# Patient Record
Sex: FEMALE | Race: WHITE | NOT HISPANIC OR LATINO | Employment: OTHER | URBAN - NONMETROPOLITAN AREA
[De-identification: names, ages, dates, MRNs, and addresses within clinical notes are randomized per-mention and may not be internally consistent; named-entity substitution may affect disease eponyms.]

---

## 2017-02-22 ENCOUNTER — OFFICE VISIT (OUTPATIENT)
Dept: CARDIOLOGY | Facility: CLINIC | Age: 47
End: 2017-02-22

## 2017-02-22 VITALS
DIASTOLIC BLOOD PRESSURE: 86 MMHG | BODY MASS INDEX: 43.4 KG/M2 | HEART RATE: 71 BPM | HEIGHT: 69 IN | WEIGHT: 293 LBS | SYSTOLIC BLOOD PRESSURE: 134 MMHG

## 2017-02-22 DIAGNOSIS — Z72.0 TOBACCO ABUSE: ICD-10-CM

## 2017-02-22 DIAGNOSIS — Q23.1 AORTIC STENOSIS WITH BICUSPID VALVE: Primary | ICD-10-CM

## 2017-02-22 DIAGNOSIS — R00.2 PALPITATIONS: ICD-10-CM

## 2017-02-22 DIAGNOSIS — Q23.0 AORTIC STENOSIS WITH BICUSPID VALVE: Primary | ICD-10-CM

## 2017-02-22 PROCEDURE — 99213 OFFICE O/P EST LOW 20 MIN: CPT | Performed by: NURSE PRACTITIONER

## 2017-02-22 RX ORDER — GABAPENTIN 100 MG/1
100 CAPSULE ORAL AS NEEDED
Refills: 2 | COMMUNITY
Start: 2016-12-21 | End: 2018-08-31

## 2017-02-22 RX ORDER — MELOXICAM 15 MG/1
TABLET ORAL AS NEEDED
Refills: 2 | COMMUNITY
Start: 2016-12-21 | End: 2017-08-25

## 2017-02-22 NOTE — PROGRESS NOTES
"Encounter Date:02/22/2017    Patient ID: Luisa Conteh is a 46 y.o. female who is  and resides in [place], [place].    CC/Reason for visit:  Follow-up (Palpitations, HTN, Results from holter monitor. )          Luisa Conteh returns today to discuss the results of her 48 hour Holter monitor.  At her last visit the patient had complaints of palpitations and at times feeling a fluttering sensation in her chest.  Her 48 hour Holter monitor was normal showing rare PACs and occasional PVCs occurring only 0.1% of the time.  She has had no further symptoms and is able to be active cooking and cleaning and caring for her family without any undue chest pain, dyspnea, orthopnea, or syncopal episodes.  She actually feels that her palpitations have lessened since her last visit.  She feels she is overall doing well and is relieved her monitor results was normal.  She is still continuing to smoke one half to one pack of cigarettes per day.  She declines any smoking cessation aids at this time.      Review of Systems   All other systems reviewed and are negative.      The patient's past medical, social, and family history reviewed in the patient's electronic medical record.    Allergies  Review of patient's allergies indicates no known allergies.    No outpatient prescriptions have been marked as taking for the 2/22/17 encounter (Office Visit) with Brian Vang MD.         Blood pressure 134/86, pulse 71, height 69\" (175.3 cm), weight 297 lb 6.4 oz (135 kg).  Body mass index is 43.92 kg/(m^2).    Physical Exam   Constitutional: She is oriented to person, place, and time. She appears well-developed and well-nourished.   HENT:   Head: Normocephalic and atraumatic.   Eyes: Pupils are equal, round, and reactive to light. No scleral icterus.   Neck: No JVD present. Carotid bruit is not present. No thyromegaly present.   Cardiovascular: Normal rate, regular rhythm, S1 normal and S2 normal.  Exam reveals " no gallop.    Murmur heard.   Harsh midsystolic murmur is present with a grade of 2/6  at the upper right sternal border radiating to the neck  Pulmonary/Chest: Effort normal and breath sounds normal.   Abdominal: Soft. There is no tenderness.   Neurological: She is alert and oriented to person, place, and time.   Skin: Skin is warm and dry. No cyanosis. Nails show no clubbing.   Psychiatric: She has a normal mood and affect. Her behavior is normal.       Data Review:   Procedures         Problem List Items Addressed This Visit        Cardiovascular and Mediastinum    Aortic stenosis with bicuspid valve - Primary    Overview     · Transesophageal echocardiogram (06/2016):  Bicuspid aortic valve present with fusion of the left and non-coronary cusp.  Moderate aortic stenosis with a mean gradient of 26 mmHg.  · NYHA class III                Current Assessment & Plan     · Echocardiogram in 6 months         Relevant Orders    Adult Transthoracic Echo Complete With Contrast    Palpitations    Overview     · 48 hour Holter (01/04/2017): Normal Holter with only rare PACs and PVCs at 0.1% burden         Current Assessment & Plan     · No further treatment needed            Other    Tobacco abuse    Current Assessment & Plan     · Tobacco abuse is unchanged  · Declines smoking cessation therapy at this time.                    · Continue current medications  · Obtain echocardiogram in 6 months on same day as follow-up.    Do CROOKS evaluated treated this patient independently    DAKSHA Rm  2/22/2017

## 2017-08-25 ENCOUNTER — HOSPITAL ENCOUNTER (OUTPATIENT)
Dept: CARDIOLOGY | Facility: HOSPITAL | Age: 47
Discharge: HOME | End: 2017-08-25
Attending: INTERNAL MEDICINE | Admitting: INTERNAL MEDICINE

## 2017-08-25 ENCOUNTER — OFFICE VISIT (OUTPATIENT)
Dept: CARDIOLOGY | Facility: CLINIC | Age: 47
End: 2017-08-25

## 2017-08-25 VITALS
WEIGHT: 293 LBS | BODY MASS INDEX: 43.4 KG/M2 | HEART RATE: 62 BPM | DIASTOLIC BLOOD PRESSURE: 86 MMHG | SYSTOLIC BLOOD PRESSURE: 132 MMHG | HEIGHT: 69 IN

## 2017-08-25 DIAGNOSIS — Q23.1 AORTIC STENOSIS WITH BICUSPID VALVE: ICD-10-CM

## 2017-08-25 DIAGNOSIS — Q23.0 AORTIC STENOSIS WITH BICUSPID VALVE: ICD-10-CM

## 2017-08-25 DIAGNOSIS — Q23.0 AORTIC STENOSIS WITH BICUSPID VALVE: Primary | ICD-10-CM

## 2017-08-25 DIAGNOSIS — Q23.1 AORTIC STENOSIS WITH BICUSPID VALVE: Primary | ICD-10-CM

## 2017-08-25 LAB
BH CV ECHO MEAS - AO MAX PG (FULL): 47.9 MMHG
BH CV ECHO MEAS - AO MAX PG: 51.6 MMHG
BH CV ECHO MEAS - AO MEAN PG (FULL): 27 MMHG
BH CV ECHO MEAS - AO MEAN PG: 28 MMHG
BH CV ECHO MEAS - AO ROOT AREA (BSA CORRECTED): 1.2
BH CV ECHO MEAS - AO ROOT AREA: 6.6 CM^2
BH CV ECHO MEAS - AO ROOT DIAM: 2.9 CM
BH CV ECHO MEAS - AO V2 MAX: 359 CM/SEC
BH CV ECHO MEAS - AO V2 MEAN: 252 CM/SEC
BH CV ECHO MEAS - AO V2 VTI: 86.2 CM
BH CV ECHO MEAS - AVA(I,A): 0.88 CM^2
BH CV ECHO MEAS - AVA(I,D): 0.88 CM^2
BH CV ECHO MEAS - AVA(V,A): 0.92 CM^2
BH CV ECHO MEAS - AVA(V,D): 0.92 CM^2
BH CV ECHO MEAS - BSA(HAYCOCK): 2.6 M^2
BH CV ECHO MEAS - BSA: 2.5 M^2
BH CV ECHO MEAS - BZI_BMI: 44.5 KILOGRAMS/M^2
BH CV ECHO MEAS - BZI_METRIC_HEIGHT: 175.3 CM
BH CV ECHO MEAS - BZI_METRIC_WEIGHT: 136.5 KG
BH CV ECHO MEAS - CONTRAST EF (2CH): 70.3 ML/M^2
BH CV ECHO MEAS - CONTRAST EF 4CH: 67 ML/M^2
BH CV ECHO MEAS - EDV(CUBED): 143.1 ML
BH CV ECHO MEAS - EDV(MOD-SP2): 118 ML
BH CV ECHO MEAS - EDV(MOD-SP4): 115 ML
BH CV ECHO MEAS - EDV(TEICH): 131.2 ML
BH CV ECHO MEAS - EF(CUBED): 59.2 %
BH CV ECHO MEAS - EF(MOD-SP2): 70.3 %
BH CV ECHO MEAS - EF(MOD-SP4): 67 %
BH CV ECHO MEAS - EF(TEICH): 50.4 %
BH CV ECHO MEAS - ESV(CUBED): 58.4 ML
BH CV ECHO MEAS - ESV(MOD-SP2): 35 ML
BH CV ECHO MEAS - ESV(MOD-SP4): 38 ML
BH CV ECHO MEAS - ESV(TEICH): 65.1 ML
BH CV ECHO MEAS - FS: 25.8 %
BH CV ECHO MEAS - IVS/LVPW: 1
BH CV ECHO MEAS - IVSD: 0.99 CM
BH CV ECHO MEAS - LA DIMENSION: 3.5 CM
BH CV ECHO MEAS - LA/AO: 1.2
BH CV ECHO MEAS - LV DIASTOLIC VOL/BSA (35-75): 46.8 ML/M^2
BH CV ECHO MEAS - LV MASS(C)D: 193.4 GRAMS
BH CV ECHO MEAS - LV MASS(C)DI: 78.7 GRAMS/M^2
BH CV ECHO MEAS - LV MAX PG: 3.6 MMHG
BH CV ECHO MEAS - LV MEAN PG: 1 MMHG
BH CV ECHO MEAS - LV SYSTOLIC VOL/BSA (12-30): 15.5 ML/M^2
BH CV ECHO MEAS - LV V1 MAX: 95.5 CM/SEC
BH CV ECHO MEAS - LV V1 MEAN: 55.1 CM/SEC
BH CV ECHO MEAS - LV V1 VTI: 21.8 CM
BH CV ECHO MEAS - LVIDD: 5.2 CM
BH CV ECHO MEAS - LVIDS: 3.9 CM
BH CV ECHO MEAS - LVLD AP2: 8.2 CM
BH CV ECHO MEAS - LVLD AP4: 8.3 CM
BH CV ECHO MEAS - LVLS AP2: 6.7 CM
BH CV ECHO MEAS - LVLS AP4: 6.1 CM
BH CV ECHO MEAS - LVOT AREA (M): 3.5 CM^2
BH CV ECHO MEAS - LVOT AREA: 3.5 CM^2
BH CV ECHO MEAS - LVOT DIAM: 2.1 CM
BH CV ECHO MEAS - LVPWD: 0.99 CM
BH CV ECHO MEAS - MV A MAX VEL: 86.9 CM/SEC
BH CV ECHO MEAS - MV E MAX VEL: 87.4 CM/SEC
BH CV ECHO MEAS - MV E/A: 1
BH CV ECHO MEAS - PA ACC SLOPE: 1022 CM/SEC^2
BH CV ECHO MEAS - PA ACC TIME: 0.09 SEC
BH CV ECHO MEAS - PA PR(ACCEL): 39.9 MMHG
BH CV ECHO MEAS - RAP SYSTOLE: 8 MMHG
BH CV ECHO MEAS - RVSP: 11.5 MMHG
BH CV ECHO MEAS - SI(AO): 231.7 ML/M^2
BH CV ECHO MEAS - SI(CUBED): 34.4 ML/M^2
BH CV ECHO MEAS - SI(LVOT): 30.7 ML/M^2
BH CV ECHO MEAS - SI(MOD-SP2): 33.8 ML/M^2
BH CV ECHO MEAS - SI(MOD-SP4): 31.3 ML/M^2
BH CV ECHO MEAS - SI(TEICH): 26.9 ML/M^2
BH CV ECHO MEAS - SV(AO): 569.4 ML
BH CV ECHO MEAS - SV(CUBED): 84.6 ML
BH CV ECHO MEAS - SV(LVOT): 75.5 ML
BH CV ECHO MEAS - SV(MOD-SP2): 83 ML
BH CV ECHO MEAS - SV(MOD-SP4): 77 ML
BH CV ECHO MEAS - SV(TEICH): 66.1 ML
BH CV ECHO MEAS - TAPSE (>1.6): 2.1 CM2
BH CV ECHO MEAS - TR MAX VEL: 93.4 CM/SEC
BH CV VAS BP LEFT ARM: NORMAL MMHG
BH CV XLRA - RV BASE: 3.7 CM
BH CV XLRA - RV LENGTH: 5.8 CM
BH CV XLRA - RV MID: 3.3 CM
LEFT ATRIUM VOLUME INDEX: 25 ML/M2
LV EF 2D ECHO EST: 65 %

## 2017-08-25 PROCEDURE — C8929 TTE W OR WO FOL WCON,DOPPLER: HCPCS

## 2017-08-25 PROCEDURE — 99214 OFFICE O/P EST MOD 30 MIN: CPT | Performed by: INTERNAL MEDICINE

## 2017-08-25 PROCEDURE — 93306 TTE W/DOPPLER COMPLETE: CPT | Performed by: INTERNAL MEDICINE

## 2017-08-25 PROCEDURE — 25010000002 SULFUR HEXAFLUORIDE MICROSPH 60.7-25 MG RECONSTITUTED SUSPENSION: Performed by: INTERNAL MEDICINE

## 2017-08-25 RX ADMIN — SULFUR HEXAFLUORIDE 3 ML: KIT at 08:50

## 2017-08-25 NOTE — ASSESSMENT & PLAN NOTE
· Stable moderate aortic stenosis with no symptoms and no indication for AVR  · Return to clinic in one year  · Repeat echo in 2019

## 2017-08-25 NOTE — PROGRESS NOTES
"Encounter Date:08/25/2017    Patient ID: Luisa Conteh is a  46 y.o. female who resides in [place], [place].    CC/Reason for visit:  Palpitations (Results from Echo done today. ) and Hypertension          Luisa Conteh returns to my office today as a follow up for palpitations and hypertension. Since last visit, patient has been feeling well overall from a cardiovascular standpoint. She states she has lost 70 pounds status post bariatric surgery, and is continually trying to lose more weight.    Review of Systems   Constitution: Negative for weakness and malaise/fatigue.   Eyes: Negative for vision loss in left eye and vision loss in right eye.   Cardiovascular: Negative for chest pain, dyspnea on exertion, near-syncope, orthopnea, palpitations, paroxysmal nocturnal dyspnea and syncope.   Musculoskeletal: Negative for myalgias.   Neurological: Negative for brief paralysis, excessive daytime sleepiness, focal weakness, numbness and paresthesias.   All other systems reviewed and are negative.      The patient's past medical, social, family history and ROS reviewed in the patient's electronic medical record.    Allergies  Review of patient's allergies indicates no known allergies.    Outpatient Prescriptions Marked as Taking for the 8/25/17 encounter (Office Visit) with Brian Vang MD   Medication Sig Dispense Refill   • gabapentin (NEURONTIN) 100 MG capsule 100 mg As Needed.  2         Blood pressure 132/86, pulse 62, height 69\" (175.3 cm), weight (!) 300 lb 3.2 oz (136 kg).  Body mass index is 44.33 kg/(m^2).    Physical Exam   Constitutional: She is oriented to person, place, and time. She appears well-developed and well-nourished.   HENT:   Head: Normocephalic and atraumatic.   Eyes: Pupils are equal, round, and reactive to light. No scleral icterus.   Neck: No JVD present. Carotid bruit is not present. No thyromegaly present.   Cardiovascular: Normal rate, regular rhythm, S1 normal and " S2 normal.  Exam reveals no gallop.    No murmur heard.  Pulmonary/Chest: Effort normal and breath sounds normal.   Abdominal: Soft. There is no hepatosplenomegaly. There is no tenderness.   Neurological: She is alert and oriented to person, place, and time.   Skin: Skin is warm and dry. No cyanosis. Nails show no clubbing.   Psychiatric: She has a normal mood and affect. Her behavior is normal.       Data Review:   Procedures    Echo performed this morning reviewed.  LV systolic function was normal with estimated EF is 65%.  The aortic valve appeared bicuspid.  The mean gradient across the valve was 28 mmHg 3, consistent with moderate aortic stenosis.       Problem List Items Addressed This Visit        Cardiovascular and Mediastinum    Aortic stenosis with bicuspid valve - Primary    Overview     · Transesophageal echocardiogram (06/2016):  Bicuspid aortic valve present with fusion of the left and non-coronary cusp.  Moderate aortic stenosis with a mean gradient of 26 mmHg.  · Echo (8/25/2017):  LVEF 65%.  Moderate aortic stenosis (mean gradient 28 mmHg)         Current Assessment & Plan     · Stable moderate aortic stenosis with no symptoms  · Return to clinic in one year  · Repeat echo in 2019                    · No indication for aortic valve replacement at present  · Return to clinic in one year  · Repeat echo in 2019    Brian Vang MD  8/25/2017     Scribed for Brian Vang MD by Regan Valles. 8/25/2017  12:00 PM

## 2018-08-31 ENCOUNTER — OFFICE VISIT (OUTPATIENT)
Dept: CARDIOLOGY | Facility: CLINIC | Age: 48
End: 2018-08-31

## 2018-08-31 VITALS
HEIGHT: 69 IN | DIASTOLIC BLOOD PRESSURE: 88 MMHG | SYSTOLIC BLOOD PRESSURE: 130 MMHG | WEIGHT: 293 LBS | BODY MASS INDEX: 43.4 KG/M2 | HEART RATE: 60 BPM

## 2018-08-31 DIAGNOSIS — Q23.0 AORTIC STENOSIS WITH BICUSPID VALVE: ICD-10-CM

## 2018-08-31 DIAGNOSIS — Q23.1 AORTIC STENOSIS WITH BICUSPID VALVE: ICD-10-CM

## 2018-08-31 DIAGNOSIS — E11.9 TYPE 2 DIABETES MELLITUS WITHOUT COMPLICATION, WITHOUT LONG-TERM CURRENT USE OF INSULIN (HCC): ICD-10-CM

## 2018-08-31 DIAGNOSIS — R00.2 PALPITATIONS: Primary | ICD-10-CM

## 2018-08-31 DIAGNOSIS — Z72.0 TOBACCO ABUSE: ICD-10-CM

## 2018-08-31 PROCEDURE — 99214 OFFICE O/P EST MOD 30 MIN: CPT | Performed by: NURSE PRACTITIONER

## 2019-05-07 DIAGNOSIS — Q23.0 AORTIC STENOSIS DUE TO BICUSPID AORTIC VALVE: ICD-10-CM

## 2019-05-07 DIAGNOSIS — I35.0 NONRHEUMATIC AORTIC VALVE STENOSIS: Primary | ICD-10-CM

## 2019-05-07 DIAGNOSIS — Q23.1 AORTIC STENOSIS DUE TO BICUSPID AORTIC VALVE: ICD-10-CM

## 2020-03-11 ENCOUNTER — OFFICE VISIT (OUTPATIENT)
Dept: CARDIAC SURGERY | Facility: CLINIC | Age: 50
End: 2020-03-11

## 2020-03-11 VITALS
OXYGEN SATURATION: 98 % | HEIGHT: 69 IN | BODY MASS INDEX: 43.4 KG/M2 | HEART RATE: 87 BPM | SYSTOLIC BLOOD PRESSURE: 106 MMHG | DIASTOLIC BLOOD PRESSURE: 58 MMHG | WEIGHT: 293 LBS

## 2020-03-11 DIAGNOSIS — Q23.0 AORTIC STENOSIS WITH BICUSPID VALVE: Primary | ICD-10-CM

## 2020-03-11 DIAGNOSIS — Q23.1 AORTIC STENOSIS WITH BICUSPID VALVE: Primary | ICD-10-CM

## 2020-03-11 DIAGNOSIS — R09.89 BILATERAL CAROTID BRUITS: ICD-10-CM

## 2020-03-11 PROCEDURE — 99204 OFFICE O/P NEW MOD 45 MIN: CPT | Performed by: THORACIC SURGERY (CARDIOTHORACIC VASCULAR SURGERY)

## 2020-03-11 RX ORDER — BENZONATATE 100 MG/1
CAPSULE ORAL
COMMUNITY
Start: 2020-01-29 | End: 2020-04-28

## 2020-03-11 RX ORDER — FUROSEMIDE 40 MG/1
TABLET ORAL
COMMUNITY
Start: 2020-02-17 | End: 2020-04-28 | Stop reason: SDUPTHER

## 2020-03-11 RX ORDER — ALBUTEROL SULFATE 90 UG/1
AEROSOL, METERED RESPIRATORY (INHALATION)
COMMUNITY
Start: 2020-01-30

## 2020-03-11 RX ORDER — POTASSIUM CHLORIDE 750 MG/1
10 TABLET, FILM COATED, EXTENDED RELEASE ORAL DAILY
COMMUNITY
Start: 2020-02-17

## 2020-03-11 RX ORDER — ALBUTEROL SULFATE 90 UG/1
AEROSOL, METERED RESPIRATORY (INHALATION)
COMMUNITY
Start: 2020-02-10 | End: 2020-05-26

## 2020-03-11 RX ORDER — ATORVASTATIN CALCIUM 10 MG/1
TABLET, FILM COATED ORAL
COMMUNITY
Start: 2020-02-12 | End: 2020-04-28 | Stop reason: SINTOL

## 2020-03-11 RX ORDER — ASPIRIN 81 MG/1
TABLET ORAL
COMMUNITY
Start: 2020-01-15 | End: 2020-04-28 | Stop reason: SDUPTHER

## 2020-03-11 RX ORDER — FUROSEMIDE 40 MG/1
40 TABLET ORAL DAILY
COMMUNITY
Start: 2020-02-26 | End: 2020-06-03 | Stop reason: HOSPADM

## 2020-03-11 NOTE — PROGRESS NOTES
03/11/2020  Patient Information  Luisa Conteh                                                                                          [address] [id number]   1970  'PCP/Referring Physician'  Melody Khalil MD  [phone]  No ref. provider found    Chief Complaint   Patient presents with   • Consult     N/p per Bettie CROOKS, for Aortic Value Stenosis. Pt states that she is SOB, fatigued and has chest pains.       History of Present Illness: 49-year-old  female with a history of hypertension, hyperlipidemia, diabetes mellitus, congestive heart failure, obesity and tobacco abuse who presents with fatigue.  She has noticed worsening fatigue and shortness of breath since January.  The patient has dyspnea when ambulating approximately 200 feet.  These symptoms have slightly improved with Lasix administration that she received while admitted to [facility] recently for congestive heart failure.  She does have occasional chest pain that she describes as an achy substernal pain that is present at rest.  She does have some dizziness with activities, but denies syncope.  Mrs. Conteh has noticed bilateral lower extremity swelling.      Patient Active Problem List   Diagnosis   • Aortic stenosis with bicuspid valve   • Obesity   • Tobacco abuse   • Type 2 diabetes mellitus without complication, without long-term current use of insulin (CMS/[facility])   • Palpitations     Past Medical History:   Diagnosis Date   • Aortic stenosis    • Aortic valve stenosis    • CHF (congestive heart failure) (CMS/[facility])    • H/O echocardiogram 08/25/2017   • Hyperlipidemia    • Hypertension    • Obesity    • Tobacco abuse    • Type 2 diabetes mellitus (CMS/[facility])      diet controlled.      Past Surgical History:   Procedure Laterality Date   • APPENDECTOMY     • CHOLECYSTECTOMY     • OTHER SURGICAL HISTORY      Lap-Band surgery in June 2015   • TONSILLECTOMY         Current Outpatient Medications:   •   albuterol sulfate HFA (PROAIR HFA) 108 (90 Base) MCG/ACT inhaler, 2 puffs q4h prn wheezing, Disp: , Rfl:   •  aspirin (ASPIR-LOW) 81 MG EC tablet, Take  by mouth., Disp: , Rfl:   •  aspirin 81 MG tablet, Take 1 tablet by mouth Daily., Disp: 30 tablet, Rfl: 11  •  atorvastatin (LIPITOR) 10 MG tablet, Take  by mouth., Disp: , Rfl:   •  benzonatate (TESSALON) 100 MG capsule, , Disp: , Rfl:   •  furosemide (LASIX) 40 MG tablet, , Disp: , Rfl:   •  furosemide (LASIX) 40 MG tablet, Take  by mouth., Disp: , Rfl:   •  potassium chloride (KLOR-CON) 10 MEQ CR tablet, Take  by mouth., Disp: , Rfl:   •  VENTOLIN  (90 Base) MCG/ACT inhaler, , Disp: , Rfl:   No Known Allergies  Social History     Socioeconomic History   • Marital status:      Spouse name: Not on file   • Number of children: 0   • Years of education: Not on file   • Highest education level: Not on file   Occupational History   • Occupation: [employer] Work     Comment: Disabled-Back, Legs, Feet   Tobacco Use   • Smoking status: Former Smoker     Packs/day: 1.50     Years: 30.00     Pack years: 45.00     Types: Cigarettes     Last attempt to quit: 2020     Years since quittin.1   • Smokeless tobacco: Never Used   • Tobacco comment: PT is on Adam patches   Substance and Sexual Activity   • Alcohol use: No   • Drug use: No   • Sexual activity: Defer   Social History Narrative    Lives in [place].      Family History   Problem Relation Age of Onset   • Stroke Mother    • Heart attack Father 59   • Hypertension Sister    • Hyperlipidemia Sister      Review of Systems   Constitution: Positive for malaise/fatigue and night sweats. Negative for chills, fever and weight loss.   HENT: Negative for congestion, hearing loss, nosebleeds and odynophagia.    Cardiovascular: Positive for chest pain, dyspnea on exertion, leg swelling and palpitations. Negative for claudication, orthopnea and syncope.   Respiratory: Positive for cough and shortness  "of breath. Negative for hemoptysis and wheezing.    Endocrine: Negative for cold intolerance, heat intolerance, polydipsia, polyphagia and polyuria.   Hematologic/Lymphatic: Does not bruise/bleed easily.   Skin: Negative for itching, poor wound healing and rash.   Musculoskeletal: Positive for back pain and neck pain. Negative for arthritis, joint pain, joint swelling and myalgias.   Gastrointestinal: Negative for abdominal pain, constipation, diarrhea, hematemesis, melena, nausea and vomiting.   Genitourinary: Negative for dysuria, frequency, hematuria, nocturia and urgency.   Neurological: Positive for light-headedness. Negative for dizziness, loss of balance and numbness.   Psychiatric/Behavioral: Negative for depression and suicidal ideas. The patient is not nervous/anxious.    Allergic/Immunologic: Negative for environmental allergies and HIV exposure.     Vitals:    03/11/20 1303   BP: 106/58   Pulse: 87   SpO2: 98%   Weight: 136 kg (300 lb)   Height: 175.3 cm (69\")      Physical Exam   Constitutional: She is oriented to person, place, and time. She appears well-developed and well-nourished. No distress.   Obese  female who appears stated age   HENT:   Head: Normocephalic and atraumatic.   Eyes: Conjunctivae are normal. No scleral icterus.   Neck: Normal range of motion. No JVD present. Carotid bruit is present. No tracheal deviation present.   Bilateral carotid artery bruits   Cardiovascular: Normal rate and regular rhythm. Exam reveals no gallop and no friction rub.   Murmur heard.  III/VI systolic ejection murmur at the right sternal border radiating to the carotid arteries   Pulmonary/Chest: Effort normal and breath sounds normal. No stridor. No respiratory distress. She has no wheezes. She has no rales.   Abdominal: Soft. She exhibits no distension and no mass. There is no tenderness. There is no rebound and no guarding.   Musculoskeletal: Normal range of motion. She exhibits no edema. "   Neurological: She is alert and oriented to person, place, and time.   Skin: Skin is warm and dry. No rash noted. She is not diaphoretic. No erythema.   Psychiatric: She has a normal mood and affect. Her behavior is normal. Judgment and thought content normal.       Labs/Imaging:  -Transthoracic echocardiogram performed 1/22/2020, per report (images unavailable from [facility]), demonstrates EF 50%, borderline concentric left ventricular hypertrophy, grade 1 left ventricular diastolic dysfunction, mild left atrial dilation, severe aortic valve stenosis with a valve area of 0.6 cm², peak gradient of 79 mmHg and mean gradient of 49 mmHg.  No aortic regurgitation.  Trace tricuspid regurgitation is present.  -CTA of the chest performed 2/14/2020, per report (images unavailable from [facility]), demonstrates a right paraesophageal lymph node measuring 2 x 1.4 cm and 1.7 x 1.3 cm.  There is a small to moderate right pleural effusion and small left pleural effusion.  There is no mention of the aorta or an aortic aneurysm.    Assessment/Plan:   49-year-old  female with a history of hypertension, hyperlipidemia, diabetes mellitus, congestive heart failure, obesity and tobacco abuse who presents with fatigue, exertional dyspnea and chest pain in the setting of severe aortic valve stenosis.  The patient will need a cardiac catheterization to rule out any significant coronary artery disease.  Her carotid bruits are likely a radiating murmur, however a carotid duplex will be obtained to rule out any significant stenosis.  I discussed with the patient options including transcatheter aortic valve replacement versus surgical aortic valve replacement.  Given her age, the patient has elected to proceed with surgical mechanical aortic valve replacement.  The risks and benefits of surgery were discussed with the patient including pain, bleeding, infection, renal failure, stroke, heart block requiring a pacemaker  and death.  The patient understood these risks and wished to proceed with surgery.    Patient Active Problem List   Diagnosis   • Aortic stenosis with bicuspid valve   • Obesity   • Tobacco abuse   • Type 2 diabetes mellitus without complication, without long-term current use of insulin (CMS/[facility])   • Palpitations

## 2020-03-17 ENCOUNTER — HOSPITAL ENCOUNTER (OUTPATIENT)
Dept: CARDIOLOGY | Facility: HOSPITAL | Age: 50
Discharge: HOME | End: 2020-03-17

## 2020-03-17 DIAGNOSIS — Z00.6 EXAMINATION FOR NORMAL COMPARISON OR CONTROL IN CLINICAL RESEARCH: ICD-10-CM

## 2020-04-27 ENCOUNTER — HOSPITAL ENCOUNTER (OUTPATIENT)
Dept: CARDIOLOGY | Facility: HOSPITAL | Age: 50
Discharge: HOME | End: 2020-04-27
Admitting: THORACIC SURGERY (CARDIOTHORACIC VASCULAR SURGERY)

## 2020-04-27 PROCEDURE — 93880 EXTRACRANIAL BILAT STUDY: CPT

## 2020-04-27 PROCEDURE — 93880 EXTRACRANIAL BILAT STUDY: CPT | Performed by: RADIOLOGY

## 2020-04-28 ENCOUNTER — OFFICE VISIT (OUTPATIENT)
Dept: CARDIOLOGY | Facility: CLINIC | Age: 50
End: 2020-04-28

## 2020-04-28 ENCOUNTER — PREP FOR SURGERY (OUTPATIENT)
Dept: OTHER | Facility: HOSPITAL | Age: 50
End: 2020-04-28

## 2020-04-28 VITALS
SYSTOLIC BLOOD PRESSURE: 95 MMHG | HEIGHT: 69 IN | HEART RATE: 90 BPM | DIASTOLIC BLOOD PRESSURE: 62 MMHG | BODY MASS INDEX: 43.4 KG/M2 | RESPIRATION RATE: 16 BRPM | OXYGEN SATURATION: 98 % | WEIGHT: 293 LBS

## 2020-04-28 DIAGNOSIS — I35.0 AORTIC STENOSIS, SEVERE: Primary | ICD-10-CM

## 2020-04-28 DIAGNOSIS — Z01.810 PREOPERATIVE CARDIOVASCULAR EXAMINATION: ICD-10-CM

## 2020-04-28 DIAGNOSIS — R06.09 DYSPNEA ON EXERTION: ICD-10-CM

## 2020-04-28 DIAGNOSIS — R07.2 PRECORDIAL PAIN: ICD-10-CM

## 2020-04-28 DIAGNOSIS — Q23.1 AORTIC STENOSIS WITH BICUSPID VALVE: ICD-10-CM

## 2020-04-28 DIAGNOSIS — Q23.0 AORTIC STENOSIS WITH BICUSPID VALVE: ICD-10-CM

## 2020-04-28 PROCEDURE — 99204 OFFICE O/P NEW MOD 45 MIN: CPT | Performed by: INTERNAL MEDICINE

## 2020-04-28 PROCEDURE — 93000 ELECTROCARDIOGRAM COMPLETE: CPT | Performed by: INTERNAL MEDICINE

## 2020-04-28 NOTE — PROGRESS NOTES
Kumar Ramirez MD  Luisa Conteh  1970 04/28/2020    Patient Active Problem List   Diagnosis   • Aortic stenosis with bicuspid valve   • Obesity   • Tobacco abuse   • Type 2 diabetes mellitus without complication, without long-term current use of insulin (CMS/[facility])   • Palpitations   • Bilateral carotid bruits       Dear Kumar Ramirez MD:    Subjective     Luisa Conteh is a 49 y.o. female with the problems as listed above, presents    Chief complaint: A preoperative cardiac evaluation in preparation for attic valve replacement for severe attic stenosis    History of Present Illness: Ms. Conteh is a pleasant 49-year-old  female who was recently noted to have acute decompensated diastolic heart failure associated with severe aortic stenosis of a bicuspid attic valve for which she was evaluated by Dr. Ramirez and is expected to undergo arctic valve replacement.  She has been referred to us by Dr. Ramirez for further cardiac evaluation with cardiac catheterization prior to undergoing aortic valve replacement.  She does complain of some intermittent chest discomfort that seem to occur at random with no relation to exertion.  She does not have any known coronary artery disease.  She has history of smoking up to 2 packs a day and has been a smoker for 30 years but quit in January 2020.  She apparently has type 2 diabetes mellitus which is controlled with diet and history of dyslipidemia for which she is on atorvastatin.  She is currently dyspneic with mild exertion with mild bilateral leg edema.    No Known Allergies:      Current Outpatient Medications:   •  albuterol sulfate HFA (PROAIR HFA) 108 (90 Base) MCG/ACT inhaler, 2 puffs q4h prn wheezing, Disp: , Rfl:   •  aspirin 81 MG tablet, Take 1 tablet by mouth Daily., Disp: 30 tablet, Rfl: 11  •  furosemide (LASIX) 40 MG tablet, 40 mg Daily., Disp: , Rfl:   •  potassium chloride (KLOR-CON) 10 MEQ CR tablet, Take  by mouth., Disp: , Rfl:   •   VENTOLIN  (90 Base) MCG/ACT inhaler, , Disp: , Rfl:     Past Medical History:   Diagnosis Date   • Aortic stenosis    • Aortic valve stenosis    • CHF (congestive heart failure) (CMS/[facility])    • H/O echocardiogram 2017   • Hyperlipidemia    • Hypertension    • Neuropathy    • Obesity    • Tobacco abuse    • Type 2 diabetes mellitus (CMS/[facility])      diet controlled.      Past Surgical History:   Procedure Laterality Date   • APPENDECTOMY     • CHOLECYSTECTOMY     • OTHER SURGICAL HISTORY      Lap-Band surgery in 2015   • TONSILLECTOMY       Family History   Problem Relation Age of Onset   • Stroke Mother    • Heart attack Father 59   • Hypertension Sister    • Hyperlipidemia Sister      Social History     Tobacco Use   • Smoking status: Former Smoker     Packs/day: 1.50     Years: 30.00     Pack years: 45.00     Types: Cigarettes     Last attempt to quit: 2020     Years since quittin.3   • Smokeless tobacco: Never Used   • Tobacco comment: PT is on Adam patches   Substance Use Topics   • Alcohol use: No   • Drug use: No       Review of Systems   Constitution: Negative for chills, diaphoresis and fever.   Eyes: Positive for visual disturbance.   Cardiovascular: Positive for chest pain and leg swelling. Negative for orthopnea, palpitations and paroxysmal nocturnal dyspnea.        Aortic  Valve disease   Respiratory: Positive for shortness of breath. Negative for cough and hemoptysis.    Endocrine: Negative for cold intolerance and heat intolerance.   Hematologic/Lymphatic: Does not bruise/bleed easily.   Skin: Negative for rash.   Musculoskeletal: Positive for back pain. Negative for myalgias.   Gastrointestinal: Negative for abdominal pain, constipation, diarrhea, nausea and vomiting.   Genitourinary: Negative for dysuria and hematuria.   Neurological: Positive for numbness and paresthesias. Negative for dizziness and focal weakness.       Objective   Blood pressure 95/62, pulse 90, resp. rate 16,  "height 175.3 cm (69\"), weight 136 kg (300 lb), SpO2 98 %.  Body mass index is 44.3 kg/m².      Physical Exam   Constitutional: She is oriented to person, place, and time. She appears well-developed and well-nourished.   HENT:   Mouth/Throat: Oropharynx is clear and moist.   Eyes: Pupils are equal, round, and reactive to light. EOM are normal.   Neck: Neck supple. No JVD present. No tracheal deviation present. No thyromegaly present.   Cardiovascular: Normal rate, regular rhythm, S1 normal and S2 normal. Exam reveals no gallop and no friction rub.   Murmur heard.   Systolic murmur is present with a grade of 3/6 at the upper right sternal border radiating to the neck.  Pulses:       Radial pulses are 2+ on the right side, and 2+ on the left side.        Dorsalis pedis pulses are 1+ on the right side, and 1+ on the left side.        Posterior tibial pulses are 1+ on the right side, and 1+ on the left side.   Pulmonary/Chest: Effort normal and breath sounds normal.   Abdominal: Soft. Bowel sounds are normal. She exhibits no mass. There is no tenderness.   Musculoskeletal: Normal range of motion. She exhibits edema (Mild edema on both legs.).   Lymphadenopathy:     She has no cervical adenopathy.   Neurological: She is alert and oriented to person, place, and time.   Skin: Skin is warm and dry. No rash noted.   Psychiatric: She has a normal mood and affect.       Lab Results   Component Value Date     11/05/2014    K 3.8 11/05/2014     11/05/2014    CO2 30.1 11/05/2014    BUN 16 11/05/2014    CREATININE 0.76 11/05/2014    GLUCOSE 98 11/05/2014    CALCIUM 9.4 11/05/2014    AST 45 (H) 11/05/2014    ALT 53 (H) 11/05/2014    ALKPHOS 126 (H) 11/05/2014    LABIL2 1.2 (L) 11/05/2014     No results found for: CKTOTAL  Lab Results   Component Value Date    WBC 13.0 (H) 11/05/2014    HGB 15.3 11/05/2014    HCT 48.2 (H) 11/05/2014     11/05/2014       ECG 12 Lead  Date/Time: 4/28/2020 3:43 PM  Performed by: " Lewis Khan MD  Authorized by: Lewis Khan MD   Previous ECG: no previous ECG available  Conduction: conduction normal  T inversion: all                  Assessment/Plan :   Diagnosis Plan   1. Aortic stenosis, severe     2. Aortic stenosis with bicuspid valve     3. Precordial pain     4. Preoperative cardiovascular examination     5. Dyspnea on exertion       Recommendations:    Orders Placed This Encounter   Procedures   • ECG 12 Lead        1. Continue with aspirin 81 mg daily.  2. I have discussed with her about the procedure of cardiac catheterization, potential risks and benefits and alternatives.  She expressed understanding and is wanting to proceed and want to be set up for next week.    Return in about 2 weeks (around 5/12/2020).    As always, Dr. Ramirez I appreciate very much the opportunity to participate in the cardiovascular care of your patients. Please do not hesitate to call me with any questions with regards to Luisa Conteh's evaluation and management.       With Best Regards,        Lewis Khan MD, [facility]    Please note that portions of this note were completed with a voice recognition program.

## 2020-05-04 ENCOUNTER — LAB (OUTPATIENT)
Dept: LAB | Facility: HOSPITAL | Age: 50
End: 2020-05-04

## 2020-05-04 DIAGNOSIS — I35.0 AORTIC STENOSIS, SEVERE: ICD-10-CM

## 2020-05-04 PROCEDURE — 80053 COMPREHEN METABOLIC PANEL: CPT

## 2020-05-04 PROCEDURE — 36415 COLL VENOUS BLD VENIPUNCTURE: CPT

## 2020-05-04 PROCEDURE — 85025 COMPLETE CBC W/AUTO DIFF WBC: CPT

## 2020-05-05 ENCOUNTER — HOSPITAL ENCOUNTER (OUTPATIENT)
Facility: HOSPITAL | Age: 50
Setting detail: HOSPITAL OUTPATIENT SURGERY
Discharge: HOME | End: 2020-05-05
Attending: INTERNAL MEDICINE | Admitting: INTERNAL MEDICINE

## 2020-05-05 VITALS
HEART RATE: 92 BPM | BODY MASS INDEX: 43.4 KG/M2 | SYSTOLIC BLOOD PRESSURE: 103 MMHG | OXYGEN SATURATION: 93 % | WEIGHT: 293 LBS | HEIGHT: 69 IN | RESPIRATION RATE: 18 BRPM | DIASTOLIC BLOOD PRESSURE: 56 MMHG | TEMPERATURE: 96.9 F

## 2020-05-05 DIAGNOSIS — I35.0 AORTIC STENOSIS, SEVERE: ICD-10-CM

## 2020-05-05 LAB
A-A DO2: 34.7 MMHG (ref 0–300)
ALBUMIN SERPL-MCNC: 4 G/DL (ref 3.5–5.2)
ALBUMIN/GLOB SERPL: 1 G/DL
ALP SERPL-CCNC: 87 U/L (ref 39–117)
ALT SERPL W P-5'-P-CCNC: 9 U/L (ref 1–33)
ANION GAP SERPL CALCULATED.3IONS-SCNC: 13.4 MMOL/L (ref 5–15)
ARTERIAL PATENCY WRIST A: ABNORMAL
AST SERPL-CCNC: 12 U/L (ref 1–32)
ATMOSPHERIC PRESS: 721 MMHG
ATMOSPHERIC PRESS: 721 MMHG
BASE EXCESS BLDA CALC-SCNC: -0.1 MMOL/L (ref 0–2)
BASE EXCESS BLDV CALC-SCNC: 1.2 MMOL/L (ref 0–2)
BASOPHILS # BLD AUTO: 0.07 10*3/MM3 (ref 0–0.2)
BASOPHILS NFR BLD AUTO: 0.7 % (ref 0–1.5)
BDY SITE: ABNORMAL
BDY SITE: ABNORMAL
BILIRUB SERPL-MCNC: 0.4 MG/DL (ref 0.2–1.2)
BODY TEMPERATURE: 0 C
BODY TEMPERATURE: 37 C
BUN BLD-MCNC: 18 MG/DL (ref 6–20)
BUN/CREAT SERPL: 22.2 (ref 7–25)
CALCIUM SPEC-SCNC: 9 MG/DL (ref 8.6–10.5)
CHLORIDE SERPL-SCNC: 100 MMOL/L (ref 98–107)
CO2 BLDA-SCNC: 26 MMOL/L (ref 22–33)
CO2 BLDA-SCNC: 28.2 MMOL/L (ref 22–33)
CO2 SERPL-SCNC: 24.6 MMOL/L (ref 22–29)
COHGB MFR BLD: 4.5 % (ref 0–5)
COHGB MFR BLD: 4.8 % (ref 0–5)
CREAT BLD-MCNC: 0.81 MG/DL (ref 0.57–1)
DEPRECATED RDW RBC AUTO: 45.9 FL (ref 37–54)
EOSINOPHIL # BLD AUTO: 0.1 10*3/MM3 (ref 0–0.4)
EOSINOPHIL NFR BLD AUTO: 1 % (ref 0.3–6.2)
ERYTHROCYTE [DISTWIDTH] IN BLOOD BY AUTOMATED COUNT: 15.6 % (ref 12.3–15.4)
GAS FLOW AIRWAY: 2 LPM
GAS FLOW AIRWAY: 2 LPM
GFR SERPL CREATININE-BSD FRML MDRD: 75 ML/MIN/1.73
GLOBULIN UR ELPH-MCNC: 4 GM/DL
GLUCOSE BLD-MCNC: 91 MG/DL (ref 65–99)
HCO3 BLDA-SCNC: 24.7 MMOL/L (ref 20–26)
HCO3 BLDV-SCNC: 26.8 MMOL/L (ref 22–28)
HCT VFR BLD AUTO: 41.4 % (ref 34–46.6)
HCT VFR BLD CALC: 39.9 % (ref 38–51)
HGB BLD-MCNC: 13.3 G/DL (ref 12–15.9)
HGB BLDA-MCNC: 13 G/DL (ref 13.5–17.5)
HGB BLDA-MCNC: 13.2 G/DL (ref 13.5–17.5)
HOROWITZ INDEX BLD+IHG-RTO: 28 %
HOROWITZ INDEX BLD+IHG-RTO: 28 %
IMM GRANULOCYTES # BLD AUTO: 0.03 10*3/MM3 (ref 0–0.05)
IMM GRANULOCYTES NFR BLD AUTO: 0.3 % (ref 0–0.5)
LYMPHOCYTES # BLD AUTO: 2.22 10*3/MM3 (ref 0.7–3.1)
LYMPHOCYTES NFR BLD AUTO: 21.5 % (ref 19.6–45.3)
Lab: ABNORMAL
Lab: ABNORMAL
MCH RBC QN AUTO: 26.1 PG (ref 26.6–33)
MCHC RBC AUTO-ENTMCNC: 32.1 G/DL (ref 31.5–35.7)
MCV RBC AUTO: 81.2 FL (ref 79–97)
METHGB BLD QL: 0.3 % (ref 0–3)
METHGB BLD QL: 0.4 % (ref 0–3)
MODALITY: ABNORMAL
MODALITY: ABNORMAL
MONOCYTES # BLD AUTO: 0.6 10*3/MM3 (ref 0.1–0.9)
MONOCYTES NFR BLD AUTO: 5.8 % (ref 5–12)
NEUTROPHILS # BLD AUTO: 7.29 10*3/MM3 (ref 1.7–7)
NEUTROPHILS NFR BLD AUTO: 70.7 % (ref 42.7–76)
NOTE: ABNORMAL
NRBC BLD AUTO-RTO: 0 /100 WBC (ref 0–0.2)
OXYHGB MFR BLDV: 63.1 % (ref 45–75)
OXYHGB MFR BLDV: 93.1 % (ref 94–99)
PCO2 BLDA: 40.1 MM HG (ref 35–45)
PCO2 BLDV: 45.5 MM HG (ref 41–51)
PCO2 TEMP ADJ BLD: ABNORMAL MM[HG]
PH BLDA: 7.4 PH UNITS (ref 7.35–7.45)
PH BLDV: 7.38 PH UNITS (ref 7.32–7.42)
PH, TEMP CORRECTED: ABNORMAL
PLATELET # BLD AUTO: 216 10*3/MM3 (ref 140–450)
PMV BLD AUTO: 10.8 FL (ref 6–12)
PO2 BLDA: 109 MM HG (ref 83–108)
PO2 BLDV: 36.6 MM HG (ref 27–53)
PO2 TEMP ADJ BLD: ABNORMAL MM[HG]
POTASSIUM BLD-SCNC: 3.9 MMOL/L (ref 3.5–5.2)
PROT SERPL-MCNC: 8 G/DL (ref 6–8.5)
RBC # BLD AUTO: 5.1 10*6/MM3 (ref 3.77–5.28)
SAO2 % BLDCOA: 97.8 % (ref 94–99)
SODIUM BLD-SCNC: 138 MMOL/L (ref 136–145)
VENTILATOR MODE: ABNORMAL
VENTILATOR MODE: ABNORMAL
WBC NRBC COR # BLD: 10.31 10*3/MM3 (ref 3.4–10.8)

## 2020-05-05 PROCEDURE — 36600 WITHDRAWAL OF ARTERIAL BLOOD: CPT

## 2020-05-05 PROCEDURE — 25010000002 DIPHENHYDRAMINE PER 50 MG: Performed by: INTERNAL MEDICINE

## 2020-05-05 PROCEDURE — 25010000002 MIDAZOLAM PER 1 MG: Performed by: INTERNAL MEDICINE

## 2020-05-05 PROCEDURE — 93456 R HRT CORONARY ARTERY ANGIO: CPT | Performed by: INTERNAL MEDICINE

## 2020-05-05 PROCEDURE — C1894 INTRO/SHEATH, NON-LASER: HCPCS | Performed by: INTERNAL MEDICINE

## 2020-05-05 PROCEDURE — C1769 GUIDE WIRE: HCPCS | Performed by: INTERNAL MEDICINE

## 2020-05-05 PROCEDURE — 25010000002 ADENOSINE PER 6 MG: Performed by: INTERNAL MEDICINE

## 2020-05-05 PROCEDURE — 0 IOPAMIDOL PER 1 ML: Performed by: INTERNAL MEDICINE

## 2020-05-05 PROCEDURE — 83050 HGB METHEMOGLOBIN QUAN: CPT

## 2020-05-05 PROCEDURE — 82820 HEMOGLOBIN-OXYGEN AFFINITY: CPT

## 2020-05-05 PROCEDURE — C1760 CLOSURE DEV, VASC: HCPCS | Performed by: INTERNAL MEDICINE

## 2020-05-05 PROCEDURE — 82375 ASSAY CARBOXYHB QUANT: CPT

## 2020-05-05 PROCEDURE — 82805 BLOOD GASES W/O2 SATURATION: CPT

## 2020-05-05 RX ORDER — DIPHENHYDRAMINE HYDROCHLORIDE 50 MG/ML
INJECTION INTRAMUSCULAR; INTRAVENOUS AS NEEDED
Status: DISCONTINUED | OUTPATIENT
Start: 2020-05-05 | End: 2020-05-05 | Stop reason: HOSPADM

## 2020-05-05 RX ORDER — METOPROLOL TARTRATE 5 MG/5ML
INJECTION INTRAVENOUS AS NEEDED
Status: DISCONTINUED | OUTPATIENT
Start: 2020-05-05 | End: 2020-05-05 | Stop reason: HOSPADM

## 2020-05-05 RX ORDER — LIDOCAINE HYDROCHLORIDE 20 MG/ML
INJECTION, SOLUTION INFILTRATION; PERINEURAL AS NEEDED
Status: DISCONTINUED | OUTPATIENT
Start: 2020-05-05 | End: 2020-05-05 | Stop reason: HOSPADM

## 2020-05-05 RX ORDER — MIDAZOLAM HYDROCHLORIDE 1 MG/ML
INJECTION INTRAMUSCULAR; INTRAVENOUS AS NEEDED
Status: DISCONTINUED | OUTPATIENT
Start: 2020-05-05 | End: 2020-05-05 | Stop reason: HOSPADM

## 2020-05-05 RX ORDER — ADENOSINE 3 MG/ML
6 INJECTION, SOLUTION INTRAVENOUS ONCE
Status: COMPLETED | OUTPATIENT
Start: 2020-05-05 | End: 2020-05-05

## 2020-05-05 RX ORDER — SODIUM CHLORIDE 9 MG/ML
100 INJECTION, SOLUTION INTRAVENOUS CONTINUOUS
Status: CANCELLED | OUTPATIENT
Start: 2020-05-05

## 2020-05-05 RX ORDER — SODIUM CHLORIDE 9 MG/ML
INJECTION, SOLUTION INTRAVENOUS CONTINUOUS PRN
Status: COMPLETED | OUTPATIENT
Start: 2020-05-05 | End: 2020-05-05

## 2020-05-05 RX ADMIN — ADENOSINE 6 MG: 3 INJECTION, SOLUTION INTRAVENOUS at 10:08

## 2020-05-05 NOTE — DISCHARGE INSTR - ACTIVITY
Take it easy for the next several days to one week, no bending over or heavy lifting, do not submerge cath site under water. No hot tubs, tub baths, or swimming pools. No driving or signing legal documents for 24 hours.

## 2020-05-05 NOTE — DISCHARGE INSTRUCTIONS
Verbal order per Dr. Khan to take two lasix pills today 5/5/2020 and two potassium pills today 5/5/2020, no other changes to home medications.

## 2020-05-07 ENCOUNTER — TELEPHONE (OUTPATIENT)
Dept: CARDIAC SURGERY | Facility: CLINIC | Age: 50
End: 2020-05-07

## 2020-05-07 NOTE — TELEPHONE ENCOUNTER
Pt seen Dr. Ramirez on 03/11/2020. She had a heart cath with Dr. Khan on 05/05 and is to f/u with Dr. Khan on 05/12. Once she has been cleared we will be able to move forward with her Surgery with Dr. Ramirez.

## 2020-05-14 ENCOUNTER — OFFICE VISIT (OUTPATIENT)
Dept: CARDIOLOGY | Facility: CLINIC | Age: 50
End: 2020-05-14

## 2020-05-14 VITALS
BODY MASS INDEX: 43.4 KG/M2 | HEIGHT: 69 IN | HEART RATE: 97 BPM | OXYGEN SATURATION: 97 % | WEIGHT: 293 LBS | SYSTOLIC BLOOD PRESSURE: 121 MMHG | DIASTOLIC BLOOD PRESSURE: 67 MMHG

## 2020-05-14 DIAGNOSIS — G47.33 OSA (OBSTRUCTIVE SLEEP APNEA): ICD-10-CM

## 2020-05-14 DIAGNOSIS — I27.20 PULMONARY HYPERTENSION (HCC): ICD-10-CM

## 2020-05-14 DIAGNOSIS — E11.9 TYPE 2 DIABETES MELLITUS WITHOUT COMPLICATION, WITHOUT LONG-TERM CURRENT USE OF INSULIN (HCC): ICD-10-CM

## 2020-05-14 DIAGNOSIS — R06.09 DYSPNEA ON EXERTION: ICD-10-CM

## 2020-05-14 DIAGNOSIS — I35.0 AORTIC STENOSIS, SEVERE: Primary | ICD-10-CM

## 2020-05-14 DIAGNOSIS — Z72.0 TOBACCO ABUSE: ICD-10-CM

## 2020-05-14 PROCEDURE — 99213 OFFICE O/P EST LOW 20 MIN: CPT | Performed by: NURSE PRACTITIONER

## 2020-05-14 RX ORDER — LISINOPRIL 2.5 MG/1
2.5 TABLET ORAL DAILY
COMMUNITY

## 2020-05-14 NOTE — PROGRESS NOTES
Kreis, Samuel Duane, MD  Luisa Conteh  1970 05/14/2020    Patient Active Problem List   Diagnosis   • Aortic stenosis with bicuspid valve   • Obesity   • Tobacco abuse   • Type 2 diabetes mellitus without complication, without long-term current use of insulin (CMS/Formerly [facility])   • Palpitations   • Bilateral carotid bruits   • Aortic stenosis, severe       Dear Kreis, Samuel Duane, MD:    Subjective     Chief Complaint   Patient presents with   • Follow-up     2 wk f/up   • Med Management     med list.    • Palpitations           History of Present Illness:    Luisa Conteh is a 49 y.o. female with a past medical history significant for diastolic heart failure associated with severe aortic stenosis with a bicuspid aortic valve.  She also has a history of diabetes mellitus type 2, hyperlipidemia, and tobacco abuse.  The patient underwent cardiac catheterization prior to aortic valve replacement surgery.  This revealed normal coronary arteries with moderate to severe pulmonary hypertension.  The patient presents today for routine cardiology follow-up.  She denies any chest pains, palpitations, dizziness, or lightheadedness.  She has chronic dyspnea with mild to moderate exertion.  She reports intermittent bilateral leg edema which improves with elevation.  She will be having aortic valve replacement in the near future.          No Known Allergies:      Current Outpatient Medications:   •  albuterol sulfate HFA (PROAIR HFA) 108 (90 Base) MCG/ACT inhaler, 2 puffs q4h prn wheezing, Disp: , Rfl:   •  aspirin 81 MG tablet, Take 1 tablet by mouth Daily., Disp: 30 tablet, Rfl: 11  •  furosemide (LASIX) 40 MG tablet, 40 mg Daily., Disp: , Rfl:   •  lisinopril (PRINIVIL,ZESTRIL) 10 MG tablet, Take 10 mg by mouth Daily., Disp: , Rfl:   •  potassium chloride (KLOR-CON) 10 MEQ CR tablet, Take  by mouth., Disp: , Rfl:   •  VENTOLIN  (90 Base) MCG/ACT inhaler, , Disp: , Rfl:       The following portions of the  "patient's history were reviewed and updated as appropriate: allergies, current medications, past family history, past medical history, past social history, past surgical history and problem list.    Social History     Tobacco Use   • Smoking status: Current Some Day Smoker     Packs/day: 0.25     Years: 30.00     Pack years: 7.50     Types: Cigarettes     Last attempt to quit: 2020     Years since quittin.3   • Smokeless tobacco: Never Used   • Tobacco comment: PT is on Adam patches   Substance Use Topics   • Alcohol use: No   • Drug use: No       Review of Systems   Constitution: Positive for malaise/fatigue. Negative for decreased appetite.   Cardiovascular: Negative for chest pain, dyspnea on exertion, irregular heartbeat, leg swelling, near-syncope, orthopnea, palpitations, paroxysmal nocturnal dyspnea and syncope.   Respiratory: Positive for shortness of breath. Negative for cough and wheezing.    Neurological: Negative for dizziness, light-headedness and weakness.       Objective   Vitals:    20 1147   BP: 121/67   BP Location: Left arm   Patient Position: Sitting   Cuff Size: Adult   Pulse: 97   SpO2: 97%   Weight: (!) 142 kg (312 lb)   Height: 175.3 cm (69\")     Body mass index is 46.07 kg/m².        Physical Exam   Constitutional: She is oriented to person, place, and time. She appears well-developed and well-nourished.   HENT:   Head: Normocephalic and atraumatic.   Cardiovascular: Normal rate and regular rhythm. Exam reveals no S3 and no S4.   Murmur (grade 3/6 systolic murmur RUSB) heard.  Pulmonary/Chest: Effort normal and breath sounds normal. She has no wheezes. She has no rales.   Abdominal: Soft. Bowel sounds are normal.   Musculoskeletal: She exhibits no edema.   Neurological: She is alert and oriented to person, place, and time.   Skin: Skin is warm and dry.   Psychiatric: She has a normal mood and affect. Her behavior is normal.       Lab Results   Component Value Date     " 05/04/2020    K 3.9 05/04/2020     05/04/2020    CO2 24.6 05/04/2020    BUN 18 05/04/2020    CREATININE 0.81 05/04/2020    GLUCOSE 91 05/04/2020    CALCIUM 9.0 05/04/2020    AST 12 05/04/2020    ALT 9 05/04/2020    ALKPHOS 87 05/04/2020    LABIL2 1.2 (L) 11/05/2014       Lab Results   Component Value Date    WBC 10.31 05/04/2020    HGB 13.3 05/04/2020    HCT 41.4 05/04/2020     05/04/2020           Procedures      Assessment/Plan    Diagnosis Plan   1. Aortic stenosis, severe     2. Dyspnea on exertion     3. Tobacco abuse     4. Type 2 diabetes mellitus without complication, without long-term current use of insulin (CMS/Formerly [facility])     5. Pulmonary hypertension (CMS/[facility])     6. PEPE (obstructive sleep apnea)                  Recommendations:    1. We have discussed the results of the right and left heart catheterization today.    2. I have reviewed her plan of care with Dr. Khan. He has cleared her for the aortic valve replacement.    3. She will follow up in 2 months or sooner if needed.    Return in about 2 months (around 7/14/2020) for Recheck.    As always, I appreciate very much the opportunity to participate in the cardiovascular care of your patients.      With Best Regards,    DAKSHA Nelson

## 2020-05-19 ENCOUNTER — PREP FOR SURGERY (OUTPATIENT)
Dept: OTHER | Facility: HOSPITAL | Age: 50
End: 2020-05-19

## 2020-05-19 DIAGNOSIS — I35.9 AORTIC VALVE DISORDER: Primary | ICD-10-CM

## 2020-05-19 RX ORDER — ACETAMINOPHEN 325 MG/1
650 TABLET ORAL EVERY 4 HOURS PRN
Status: CANCELLED | OUTPATIENT
Start: 2020-05-19

## 2020-05-19 RX ORDER — CHLORHEXIDINE GLUCONATE 500 MG/1
1 CLOTH TOPICAL EVERY 12 HOURS PRN
Status: CANCELLED | OUTPATIENT
Start: 2020-05-19

## 2020-05-19 RX ORDER — NITROGLYCERIN 0.4 MG/1
0.4 TABLET SUBLINGUAL
Status: CANCELLED | OUTPATIENT
Start: 2020-05-19

## 2020-05-19 RX ORDER — ASPIRIN 325 MG
325 TABLET ORAL NIGHTLY
Status: CANCELLED | OUTPATIENT
Start: 2020-05-19 | End: 2020-05-20

## 2020-05-19 RX ORDER — CHLORHEXIDINE GLUCONATE 0.12 MG/ML
15 RINSE ORAL ONCE
Status: CANCELLED | OUTPATIENT
Start: 2020-05-19 | End: 2020-05-19

## 2020-05-26 ENCOUNTER — APPOINTMENT (OUTPATIENT)
Dept: PREADMISSION TESTING | Facility: HOSPITAL | Age: 50
End: 2020-05-26

## 2020-05-26 ENCOUNTER — HOSPITAL ENCOUNTER (OUTPATIENT)
Dept: GENERAL RADIOLOGY | Facility: HOSPITAL | Age: 50
Discharge: HOME | End: 2020-05-26
Admitting: PHYSICIAN ASSISTANT

## 2020-05-26 ENCOUNTER — HOSPITAL ENCOUNTER (OUTPATIENT)
Dept: PULMONOLOGY | Facility: HOSPITAL | Age: 50
Discharge: HOME | End: 2020-05-26

## 2020-05-26 VITALS — OXYGEN SATURATION: 94 % | WEIGHT: 293 LBS | HEIGHT: 69 IN | BODY MASS INDEX: 43.4 KG/M2

## 2020-05-26 DIAGNOSIS — I35.9 AORTIC VALVE DISORDER: ICD-10-CM

## 2020-05-26 LAB
ABO GROUP BLD: NORMAL
ALBUMIN SERPL-MCNC: 4.4 G/DL (ref 3.5–5.2)
ALBUMIN/GLOB SERPL: 1.3 G/DL
ALP SERPL-CCNC: 91 U/L (ref 39–117)
ALT SERPL W P-5'-P-CCNC: 11 U/L (ref 1–33)
AMPHET+METHAMPHET UR QL: NEGATIVE
AMPHETAMINES UR QL: NEGATIVE
ANION GAP SERPL CALCULATED.3IONS-SCNC: 13 MMOL/L (ref 5–15)
APTT PPP: 30.3 SECONDS (ref 24–37)
AST SERPL-CCNC: 15 U/L (ref 1–32)
BARBITURATES UR QL SCN: NEGATIVE
BASOPHILS # BLD AUTO: 0.1 10*3/MM3 (ref 0–0.2)
BASOPHILS NFR BLD AUTO: 0.8 % (ref 0–1.5)
BENZODIAZ UR QL SCN: NEGATIVE
BILIRUB SERPL-MCNC: 0.3 MG/DL (ref 0.2–1.2)
BLD GP AB SCN SERPL QL: NEGATIVE
BUN BLD-MCNC: 16 MG/DL (ref 6–20)
BUN/CREAT SERPL: 17 (ref 7–25)
BUPRENORPHINE SERPL-MCNC: NEGATIVE NG/ML
CALCIUM SPEC-SCNC: 8.9 MG/DL (ref 8.6–10.5)
CANNABINOIDS SERPL QL: NEGATIVE
CHLORIDE SERPL-SCNC: 100 MMOL/L (ref 98–107)
CO2 SERPL-SCNC: 24 MMOL/L (ref 22–29)
COCAINE UR QL: NEGATIVE
CREAT BLD-MCNC: 0.94 MG/DL (ref 0.57–1)
DEPRECATED RDW RBC AUTO: 50.8 FL (ref 37–54)
EOSINOPHIL # BLD AUTO: 0.15 10*3/MM3 (ref 0–0.4)
EOSINOPHIL NFR BLD AUTO: 1.2 % (ref 0.3–6.2)
ERYTHROCYTE [DISTWIDTH] IN BLOOD BY AUTOMATED COUNT: 16.8 % (ref 12.3–15.4)
GFR SERPL CREATININE-BSD FRML MDRD: 63 ML/MIN/1.73
GLOBULIN UR ELPH-MCNC: 3.4 GM/DL
GLUCOSE BLD-MCNC: 100 MG/DL (ref 65–99)
HBA1C MFR BLD: 6.2 % (ref 4.8–5.6)
HCT VFR BLD AUTO: 46.2 % (ref 34–46.6)
HGB BLD-MCNC: 14.1 G/DL (ref 12–15.9)
IMM GRANULOCYTES # BLD AUTO: 0.05 10*3/MM3 (ref 0–0.05)
IMM GRANULOCYTES NFR BLD AUTO: 0.4 % (ref 0–0.5)
INR PPP: 1.09 (ref 0.85–1.16)
LYMPHOCYTES # BLD AUTO: 2.81 10*3/MM3 (ref 0.7–3.1)
LYMPHOCYTES NFR BLD AUTO: 23.3 % (ref 19.6–45.3)
MAGNESIUM SERPL-MCNC: 2 MG/DL (ref 1.6–2.6)
MCH RBC QN AUTO: 25.4 PG (ref 26.6–33)
MCHC RBC AUTO-ENTMCNC: 30.5 G/DL (ref 31.5–35.7)
MCV RBC AUTO: 83.2 FL (ref 79–97)
METHADONE UR QL SCN: NEGATIVE
MONOCYTES # BLD AUTO: 0.57 10*3/MM3 (ref 0.1–0.9)
MONOCYTES NFR BLD AUTO: 4.7 % (ref 5–12)
NEUTROPHILS # BLD AUTO: 8.4 10*3/MM3 (ref 1.7–7)
NEUTROPHILS NFR BLD AUTO: 69.6 % (ref 42.7–76)
NRBC BLD AUTO-RTO: 0 /100 WBC (ref 0–0.2)
OPIATES UR QL: NEGATIVE
OXYCODONE UR QL SCN: NEGATIVE
PA ADP PRP-ACNC: 193 PRU
PCP UR QL SCN: NEGATIVE
PLATELET # BLD AUTO: 220 10*3/MM3 (ref 140–450)
PMV BLD AUTO: 10.1 FL (ref 6–12)
POTASSIUM BLD-SCNC: 4.2 MMOL/L (ref 3.5–5.2)
PROPOXYPH UR QL: NEGATIVE
PROT SERPL-MCNC: 7.8 G/DL (ref 6–8.5)
PROTHROMBIN TIME: 13.8 SECONDS (ref 11.5–14)
RBC # BLD AUTO: 5.55 10*6/MM3 (ref 3.77–5.28)
RH BLD: POSITIVE
SODIUM BLD-SCNC: 137 MMOL/L (ref 136–145)
T&S EXPIRATION DATE: NORMAL
TRICYCLICS UR QL SCN: NEGATIVE
WBC NRBC COR # BLD: 12.08 10*3/MM3 (ref 3.4–10.8)

## 2020-05-26 PROCEDURE — 85610 PROTHROMBIN TIME: CPT | Performed by: PHYSICIAN ASSISTANT

## 2020-05-26 PROCEDURE — 86850 RBC ANTIBODY SCREEN: CPT | Performed by: PHYSICIAN ASSISTANT

## 2020-05-26 PROCEDURE — 85025 COMPLETE CBC W/AUTO DIFF WBC: CPT | Performed by: PHYSICIAN ASSISTANT

## 2020-05-26 PROCEDURE — 86923 COMPATIBILITY TEST ELECTRIC: CPT

## 2020-05-26 PROCEDURE — 86901 BLOOD TYPING SEROLOGIC RH(D): CPT | Performed by: PHYSICIAN ASSISTANT

## 2020-05-26 PROCEDURE — 80053 COMPREHEN METABOLIC PANEL: CPT | Performed by: PHYSICIAN ASSISTANT

## 2020-05-26 PROCEDURE — U0002 COVID-19 LAB TEST NON-CDC: HCPCS

## 2020-05-26 PROCEDURE — 83735 ASSAY OF MAGNESIUM: CPT | Performed by: PHYSICIAN ASSISTANT

## 2020-05-26 PROCEDURE — 80306 DRUG TEST PRSMV INSTRMNT: CPT | Performed by: PHYSICIAN ASSISTANT

## 2020-05-26 PROCEDURE — 94010 BREATHING CAPACITY TEST: CPT

## 2020-05-26 PROCEDURE — 83036 HEMOGLOBIN GLYCOSYLATED A1C: CPT | Performed by: PHYSICIAN ASSISTANT

## 2020-05-26 PROCEDURE — 93005 ELECTROCARDIOGRAM TRACING: CPT

## 2020-05-26 PROCEDURE — 94010 BREATHING CAPACITY TEST: CPT | Performed by: INTERNAL MEDICINE

## 2020-05-26 PROCEDURE — 71046 X-RAY EXAM CHEST 2 VIEWS: CPT

## 2020-05-26 PROCEDURE — 93010 ELECTROCARDIOGRAM REPORT: CPT | Performed by: INTERNAL MEDICINE

## 2020-05-26 PROCEDURE — 85730 THROMBOPLASTIN TIME PARTIAL: CPT | Performed by: PHYSICIAN ASSISTANT

## 2020-05-26 PROCEDURE — 36415 COLL VENOUS BLD VENIPUNCTURE: CPT

## 2020-05-26 PROCEDURE — 86900 BLOOD TYPING SEROLOGIC ABO: CPT | Performed by: PHYSICIAN ASSISTANT

## 2020-05-26 PROCEDURE — C9803 HOPD COVID-19 SPEC COLLECT: HCPCS

## 2020-05-26 PROCEDURE — 85576 BLOOD PLATELET AGGREGATION: CPT | Performed by: PHYSICIAN ASSISTANT

## 2020-05-26 RX ORDER — CHLORHEXIDINE GLUCONATE 500 MG/1
1 CLOTH TOPICAL EVERY 12 HOURS PRN
Status: ACTIVE | OUTPATIENT
Start: 2020-05-26

## 2020-05-26 RX ORDER — ASPIRIN 325 MG
325 TABLET ORAL NIGHTLY
Status: SHIPPED | OUTPATIENT
Start: 2020-05-26 | End: 2020-05-27

## 2020-05-27 LAB
REF LAB TEST METHOD: NORMAL
SARS-COV-2 RNA RESP QL NAA+PROBE: NOT DETECTED

## 2020-05-29 ENCOUNTER — APPOINTMENT (OUTPATIENT)
Dept: GENERAL RADIOLOGY | Facility: HOSPITAL | Age: 50
End: 2020-05-29

## 2020-05-29 ENCOUNTER — ANCILLARY PROCEDURE (OUTPATIENT)
Dept: PERIOP | Facility: HOSPITAL | Age: 50
End: 2020-05-29

## 2020-05-29 ENCOUNTER — ANESTHESIA (OUTPATIENT)
Dept: PERIOP | Facility: HOSPITAL | Age: 50
End: 2020-05-29

## 2020-05-29 ENCOUNTER — ANESTHESIA EVENT (OUTPATIENT)
Dept: PERIOP | Facility: HOSPITAL | Age: 50
End: 2020-05-29

## 2020-05-29 ENCOUNTER — HOSPITAL ENCOUNTER (INPATIENT)
Facility: HOSPITAL | Age: 50
LOS: 5 days | Discharge: HOME | End: 2020-06-03
Attending: THORACIC SURGERY (CARDIOTHORACIC VASCULAR SURGERY) | Admitting: THORACIC SURGERY (CARDIOTHORACIC VASCULAR SURGERY)

## 2020-05-29 DIAGNOSIS — Z95.2 H/O MECHANICAL AORTIC VALVE REPLACEMENT: ICD-10-CM

## 2020-05-29 DIAGNOSIS — Q23.1 AORTIC STENOSIS WITH BICUSPID VALVE: Primary | ICD-10-CM

## 2020-05-29 DIAGNOSIS — Q23.0 AORTIC STENOSIS WITH BICUSPID VALVE: ICD-10-CM

## 2020-05-29 DIAGNOSIS — Q23.0 AORTIC STENOSIS WITH BICUSPID VALVE: Primary | ICD-10-CM

## 2020-05-29 DIAGNOSIS — I35.9 AORTIC VALVE DISORDER: ICD-10-CM

## 2020-05-29 DIAGNOSIS — Q23.1 AORTIC STENOSIS WITH BICUSPID VALVE: ICD-10-CM

## 2020-05-29 DIAGNOSIS — Z74.09 IMPAIRED FUNCTIONAL MOBILITY, BALANCE, GAIT, AND ENDURANCE: ICD-10-CM

## 2020-05-29 PROBLEM — I50.30 DIASTOLIC HEART FAILURE (HCC): Status: ACTIVE | Noted: 2020-05-29

## 2020-05-29 PROBLEM — E78.5 HYPERLIPIDEMIA: Status: ACTIVE | Noted: 2020-05-29

## 2020-05-29 PROBLEM — G47.30 SLEEP APNEA WITH USE OF CONTINUOUS POSITIVE AIRWAY PRESSURE (CPAP): Status: ACTIVE | Noted: 2020-05-29

## 2020-05-29 PROBLEM — I10 HYPERTENSION: Status: ACTIVE | Noted: 2020-05-29

## 2020-05-29 PROBLEM — I27.21 SECONDARY PULMONARY ARTERIAL HYPERTENSION (HCC): Status: ACTIVE | Noted: 2020-05-29

## 2020-05-29 LAB
ABO GROUP BLD: NORMAL
ACT BLD: 109 SECONDS (ref 82–152)
ACT BLD: 114 SECONDS (ref 82–152)
ACT BLD: 125 SECONDS (ref 82–152)
ACT BLD: 593 SECONDS (ref 82–152)
ACT BLD: 753 SECONDS (ref 82–152)
ACT BLD: 929 SECONDS (ref 82–152)
ALBUMIN SERPL-MCNC: 3.6 G/DL (ref 3.5–5.2)
ALBUMIN SERPL-MCNC: 4.4 G/DL (ref 3.5–5.2)
ALBUMIN SERPL-MCNC: 4.4 G/DL (ref 3.5–5.2)
ANION GAP SERPL CALCULATED.3IONS-SCNC: 12 MMOL/L (ref 5–15)
ANION GAP SERPL CALCULATED.3IONS-SCNC: 12 MMOL/L (ref 5–15)
ANION GAP SERPL CALCULATED.3IONS-SCNC: 15 MMOL/L (ref 5–15)
APTT PPP: 29.8 SECONDS (ref 24–37)
ARTERIAL PATENCY WRIST A: ABNORMAL
ATMOSPHERIC PRESS: ABNORMAL MM[HG]
BASE EXCESS BLDA CALC-SCNC: -1 MMOL/L (ref -5–5)
BASE EXCESS BLDA CALC-SCNC: -1.6 MMOL/L (ref 0–2)
BASE EXCESS BLDA CALC-SCNC: -2 MMOL/L (ref -5–5)
BASE EXCESS BLDA CALC-SCNC: -3 MMOL/L (ref -5–5)
BASE EXCESS BLDA CALC-SCNC: -3.4 MMOL/L (ref 0–2)
BASE EXCESS BLDA CALC-SCNC: -3.9 MMOL/L (ref 0–2)
BASE EXCESS BLDA CALC-SCNC: -4 MMOL/L (ref -5–5)
BASE EXCESS BLDA CALC-SCNC: -4.3 MMOL/L (ref 0–2)
BASE EXCESS BLDA CALC-SCNC: 0 MMOL/L (ref -5–5)
BASE EXCESS BLDA CALC-SCNC: 0 MMOL/L (ref -5–5)
BASE EXCESS BLDA CALC-SCNC: 2 MMOL/L (ref -5–5)
BDY SITE: ABNORMAL
BODY TEMPERATURE: 37 C
BUN BLD-MCNC: 15 MG/DL (ref 6–20)
BUN BLD-MCNC: 16 MG/DL (ref 6–20)
BUN BLD-MCNC: 16 MG/DL (ref 6–20)
BUN/CREAT SERPL: 14.3 (ref 7–25)
BUN/CREAT SERPL: 15.8 (ref 7–25)
BUN/CREAT SERPL: 17.6 (ref 7–25)
CA-I BLDA-SCNC: 1.02 MMOL/L (ref 1.2–1.32)
CA-I BLDA-SCNC: 1.02 MMOL/L (ref 1.2–1.32)
CA-I BLDA-SCNC: 1.04 MMOL/L (ref 1.2–1.32)
CA-I BLDA-SCNC: 1.13 MMOL/L (ref 1.2–1.32)
CA-I BLDA-SCNC: 1.18 MMOL/L (ref 1.2–1.32)
CA-I BLDA-SCNC: 1.2 MMOL/L (ref 1.2–1.32)
CA-I BLDA-SCNC: 1.21 MMOL/L (ref 1.2–1.32)
CA-I SERPL ISE-MCNC: 1.24 MMOL/L (ref 1.12–1.32)
CALCIUM SPEC-SCNC: 8.3 MG/DL (ref 8.6–10.5)
CALCIUM SPEC-SCNC: 8.5 MG/DL (ref 8.6–10.5)
CALCIUM SPEC-SCNC: 8.7 MG/DL (ref 8.6–10.5)
CHLORIDE SERPL-SCNC: 106 MMOL/L (ref 98–107)
CHLORIDE SERPL-SCNC: 106 MMOL/L (ref 98–107)
CHLORIDE SERPL-SCNC: 108 MMOL/L (ref 98–107)
CHOLEST SERPL-MCNC: 111 MG/DL (ref 0–200)
CO2 BLDA-SCNC: 22 MMOL/L (ref 24–29)
CO2 BLDA-SCNC: 23.4 MMOL/L (ref 22–33)
CO2 BLDA-SCNC: 23.6 MMOL/L (ref 22–33)
CO2 BLDA-SCNC: 24 MMOL/L (ref 24–29)
CO2 BLDA-SCNC: 24.4 MMOL/L (ref 22–33)
CO2 BLDA-SCNC: 25 MMOL/L (ref 24–29)
CO2 BLDA-SCNC: 25.2 MMOL/L (ref 22–33)
CO2 BLDA-SCNC: 26 MMOL/L (ref 24–29)
CO2 BLDA-SCNC: 27 MMOL/L (ref 24–29)
CO2 BLDA-SCNC: 27 MMOL/L (ref 24–29)
CO2 BLDA-SCNC: 29 MMOL/L (ref 24–29)
CO2 SERPL-SCNC: 19 MMOL/L (ref 22–29)
CO2 SERPL-SCNC: 22 MMOL/L (ref 22–29)
CO2 SERPL-SCNC: 22 MMOL/L (ref 22–29)
COHGB MFR BLD: 2 % (ref 0–2)
COHGB MFR BLD: 2.2 % (ref 0–2)
COHGB MFR BLD: 2.7 % (ref 0–2)
COHGB MFR BLD: 2.7 % (ref 0–2)
CREAT BLD-MCNC: 0.91 MG/DL (ref 0.57–1)
CREAT BLD-MCNC: 1.01 MG/DL (ref 0.57–1)
CREAT BLD-MCNC: 1.05 MG/DL (ref 0.57–1)
DEPRECATED RDW RBC AUTO: 51.8 FL (ref 37–54)
DEPRECATED RDW RBC AUTO: 52 FL (ref 37–54)
DEPRECATED RDW RBC AUTO: 53.5 FL (ref 37–54)
EPAP: 0
ERYTHROCYTE [DISTWIDTH] IN BLOOD BY AUTOMATED COUNT: 16.8 % (ref 12.3–15.4)
ERYTHROCYTE [DISTWIDTH] IN BLOOD BY AUTOMATED COUNT: 16.9 % (ref 12.3–15.4)
ERYTHROCYTE [DISTWIDTH] IN BLOOD BY AUTOMATED COUNT: 17.1 % (ref 12.3–15.4)
GFR SERPL CREATININE-BSD FRML MDRD: 55 ML/MIN/1.73
GFR SERPL CREATININE-BSD FRML MDRD: 58 ML/MIN/1.73
GFR SERPL CREATININE-BSD FRML MDRD: 65 ML/MIN/1.73
GLUCOSE BLD-MCNC: 119 MG/DL (ref 65–99)
GLUCOSE BLD-MCNC: 149 MG/DL (ref 65–99)
GLUCOSE BLD-MCNC: 191 MG/DL (ref 65–99)
GLUCOSE BLDC GLUCOMTR-MCNC: 102 MG/DL (ref 70–130)
GLUCOSE BLDC GLUCOMTR-MCNC: 111 MG/DL (ref 70–130)
GLUCOSE BLDC GLUCOMTR-MCNC: 122 MG/DL (ref 70–130)
GLUCOSE BLDC GLUCOMTR-MCNC: 132 MG/DL (ref 70–130)
GLUCOSE BLDC GLUCOMTR-MCNC: 135 MG/DL (ref 70–130)
GLUCOSE BLDC GLUCOMTR-MCNC: 136 MG/DL (ref 70–130)
GLUCOSE BLDC GLUCOMTR-MCNC: 137 MG/DL (ref 70–130)
GLUCOSE BLDC GLUCOMTR-MCNC: 147 MG/DL (ref 70–130)
GLUCOSE BLDC GLUCOMTR-MCNC: 163 MG/DL (ref 70–130)
GLUCOSE BLDC GLUCOMTR-MCNC: 171 MG/DL (ref 70–130)
GLUCOSE BLDC GLUCOMTR-MCNC: 184 MG/DL (ref 70–130)
GLUCOSE BLDC GLUCOMTR-MCNC: 188 MG/DL (ref 70–130)
HCG INTACT+B SERPL-ACNC: <0.5 MIU/ML
HCO3 BLDA-SCNC: 21 MMOL/L (ref 22–26)
HCO3 BLDA-SCNC: 22.1 MMOL/L (ref 20–26)
HCO3 BLDA-SCNC: 22.3 MMOL/L (ref 20–26)
HCO3 BLDA-SCNC: 22.8 MMOL/L (ref 22–26)
HCO3 BLDA-SCNC: 22.9 MMOL/L (ref 20–26)
HCO3 BLDA-SCNC: 23.5 MMOL/L (ref 22–26)
HCO3 BLDA-SCNC: 23.9 MMOL/L (ref 20–26)
HCO3 BLDA-SCNC: 25.2 MMOL/L (ref 22–26)
HCO3 BLDA-SCNC: 25.4 MMOL/L (ref 22–26)
HCO3 BLDA-SCNC: 25.5 MMOL/L (ref 22–26)
HCO3 BLDA-SCNC: 27.6 MMOL/L (ref 22–26)
HCT VFR BLD AUTO: 41.2 % (ref 34–46.6)
HCT VFR BLD AUTO: 41.9 % (ref 34–46.6)
HCT VFR BLD AUTO: 42.7 % (ref 34–46.6)
HCT VFR BLD CALC: 40.4 %
HCT VFR BLD CALC: 40.5 %
HCT VFR BLD CALC: 40.6 %
HCT VFR BLD CALC: 40.8 %
HCT VFR BLDA CALC: 34 % (ref 38–51)
HCT VFR BLDA CALC: 35 % (ref 38–51)
HCT VFR BLDA CALC: 37 % (ref 38–51)
HCT VFR BLDA CALC: 37 % (ref 38–51)
HCT VFR BLDA CALC: 39 % (ref 38–51)
HCT VFR BLDA CALC: 39 % (ref 38–51)
HCT VFR BLDA CALC: 41 % (ref 38–51)
HDLC SERPL-MCNC: 28 MG/DL (ref 40–60)
HGB BLD-MCNC: 12.6 G/DL (ref 12–15.9)
HGB BLD-MCNC: 12.6 G/DL (ref 12–15.9)
HGB BLD-MCNC: 12.7 G/DL (ref 12–15.9)
HGB BLDA-MCNC: 11.6 G/DL (ref 12–17)
HGB BLDA-MCNC: 11.9 G/DL (ref 12–17)
HGB BLDA-MCNC: 12.6 G/DL (ref 12–17)
HGB BLDA-MCNC: 12.6 G/DL (ref 12–17)
HGB BLDA-MCNC: 13.2 G/DL (ref 14–18)
HGB BLDA-MCNC: 13.3 G/DL (ref 12–17)
HGB BLDA-MCNC: 13.3 G/DL (ref 12–17)
HGB BLDA-MCNC: 13.3 G/DL (ref 14–18)
HGB BLDA-MCNC: 13.9 G/DL (ref 12–17)
HOROWITZ INDEX BLD+IHG-RTO: 100 %
HOROWITZ INDEX BLD+IHG-RTO: 40 %
INR PPP: 1.45 (ref 0.85–1.16)
IPAP: 0
LDLC SERPL CALC-MCNC: 69 MG/DL (ref 0–100)
LDLC/HDLC SERPL: 2.47 {RATIO}
MAGNESIUM SERPL-MCNC: 2.6 MG/DL (ref 1.6–2.6)
MAGNESIUM SERPL-MCNC: 2.8 MG/DL (ref 1.6–2.6)
MAGNESIUM SERPL-MCNC: 3.3 MG/DL (ref 1.6–2.6)
MCH RBC QN AUTO: 25.7 PG (ref 26.6–33)
MCH RBC QN AUTO: 25.7 PG (ref 26.6–33)
MCH RBC QN AUTO: 25.8 PG (ref 26.6–33)
MCHC RBC AUTO-ENTMCNC: 29.7 G/DL (ref 31.5–35.7)
MCHC RBC AUTO-ENTMCNC: 30.1 G/DL (ref 31.5–35.7)
MCHC RBC AUTO-ENTMCNC: 30.6 G/DL (ref 31.5–35.7)
MCV RBC AUTO: 84.3 FL (ref 79–97)
MCV RBC AUTO: 85.3 FL (ref 79–97)
MCV RBC AUTO: 86.3 FL (ref 79–97)
METHGB BLD QL: 0.8 % (ref 0–1.5)
METHGB BLD QL: 1 % (ref 0–1.5)
METHGB BLD QL: 1.1 % (ref 0–1.5)
METHGB BLD QL: 1.1 % (ref 0–1.5)
MODALITY: ABNORMAL
NOTE: ABNORMAL
OXYHGB MFR BLDV: 92.1 % (ref 94–99)
OXYHGB MFR BLDV: 93.9 % (ref 94–99)
OXYHGB MFR BLDV: 94.6 % (ref 94–99)
OXYHGB MFR BLDV: 95.6 % (ref 94–99)
PAW @ PEAK INSP FLOW SETTING VENT: 0 CMH2O
PCO2 BLDA: 34.4 MM HG (ref 35–45)
PCO2 BLDA: 40.6 MM HG (ref 35–45)
PCO2 BLDA: 42 MM HG (ref 35–45)
PCO2 BLDA: 42.3 MM HG (ref 35–45)
PCO2 BLDA: 42.8 MM HG (ref 35–45)
PCO2 BLDA: 43 MM HG (ref 35–45)
PCO2 BLDA: 43.3 MM HG (ref 35–45)
PCO2 BLDA: 44.5 MM HG (ref 35–45)
PCO2 BLDA: 47.6 MM HG (ref 35–45)
PCO2 BLDA: 48.9 MM HG (ref 35–45)
PCO2 BLDA: 49.6 MM HG (ref 35–45)
PCO2 TEMP ADJ BLD: 42 MM HG (ref 35–45)
PCO2 TEMP ADJ BLD: 42.3 MM HG (ref 35–45)
PCO2 TEMP ADJ BLD: 44.5 MM HG (ref 35–45)
PCO2 TEMP ADJ BLD: 47.6 MM HG (ref 35–45)
PH BLDA: 7.29 PH UNITS (ref 7.35–7.45)
PH BLDA: 7.3 PH UNITS (ref 7.35–7.45)
PH BLDA: 7.32 PH UNITS (ref 7.35–7.6)
PH BLDA: 7.33 PH UNITS (ref 7.35–7.45)
PH BLDA: 7.33 PH UNITS (ref 7.35–7.6)
PH BLDA: 7.35 PH UNITS (ref 7.35–7.6)
PH BLDA: 7.35 PH UNITS (ref 7.35–7.6)
PH BLDA: 7.36 PH UNITS (ref 7.35–7.45)
PH BLDA: 7.38 PH UNITS (ref 7.35–7.6)
PH BLDA: 7.39 PH UNITS (ref 7.35–7.6)
PH BLDA: 7.4 PH UNITS (ref 7.35–7.6)
PH, TEMP CORRECTED: 7.29 PH UNITS
PH, TEMP CORRECTED: 7.3 PH UNITS
PH, TEMP CORRECTED: 7.33 PH UNITS
PH, TEMP CORRECTED: 7.36 PH UNITS
PHOSPHATE SERPL-MCNC: 3.9 MG/DL (ref 2.5–4.5)
PHOSPHATE SERPL-MCNC: 4.3 MG/DL (ref 2.5–4.5)
PHOSPHATE SERPL-MCNC: 4.4 MG/DL (ref 2.5–4.5)
PLATELET # BLD AUTO: 132 10*3/MM3 (ref 140–450)
PLATELET # BLD AUTO: 158 10*3/MM3 (ref 140–450)
PLATELET # BLD AUTO: 183 10*3/MM3 (ref 140–450)
PMV BLD AUTO: 10.3 FL (ref 6–12)
PMV BLD AUTO: 10.7 FL (ref 6–12)
PMV BLD AUTO: 10.7 FL (ref 6–12)
PO2 BLDA: 207 MM HG (ref 83–108)
PO2 BLDA: 274 MMHG (ref 80–105)
PO2 BLDA: 323 MMHG (ref 80–105)
PO2 BLDA: 396 MMHG (ref 80–105)
PO2 BLDA: 423 MMHG (ref 80–105)
PO2 BLDA: 46 MMHG (ref 80–105)
PO2 BLDA: 516 MMHG (ref 80–105)
PO2 BLDA: 64 MMHG (ref 80–105)
PO2 BLDA: 82.6 MM HG (ref 83–108)
PO2 BLDA: 94.2 MM HG (ref 83–108)
PO2 BLDA: 96.8 MM HG (ref 83–108)
PO2 TEMP ADJ BLD: 207 MM HG (ref 83–108)
PO2 TEMP ADJ BLD: 82.6 MM HG (ref 83–108)
PO2 TEMP ADJ BLD: 94.2 MM HG (ref 83–108)
PO2 TEMP ADJ BLD: 96.8 MM HG (ref 83–108)
POTASSIUM BLD-SCNC: 4.3 MMOL/L (ref 3.5–5.2)
POTASSIUM BLD-SCNC: 4.6 MMOL/L (ref 3.5–5.2)
POTASSIUM BLD-SCNC: 4.9 MMOL/L (ref 3.5–5.2)
POTASSIUM BLDA-SCNC: 4 MMOL/L (ref 3.5–4.9)
POTASSIUM BLDA-SCNC: 4.2 MMOL/L (ref 3.5–4.9)
POTASSIUM BLDA-SCNC: 4.5 MMOL/L (ref 3.5–4.9)
POTASSIUM BLDA-SCNC: 4.6 MMOL/L (ref 3.5–4.9)
POTASSIUM BLDA-SCNC: 4.9 MMOL/L (ref 3.5–4.9)
POTASSIUM BLDA-SCNC: 4.9 MMOL/L (ref 3.5–4.9)
POTASSIUM BLDA-SCNC: 5.1 MMOL/L (ref 3.5–4.9)
PROTHROMBIN TIME: 17.3 SECONDS (ref 11.5–14)
RBC # BLD AUTO: 4.89 10*6/MM3 (ref 3.77–5.28)
RBC # BLD AUTO: 4.91 10*6/MM3 (ref 3.77–5.28)
RBC # BLD AUTO: 4.95 10*6/MM3 (ref 3.77–5.28)
RH BLD: POSITIVE
SAO2 % BLDA: 100 % (ref 95–98)
SAO2 % BLDA: 79 % (ref 95–98)
SAO2 % BLDA: 92 % (ref 95–98)
SODIUM BLD-SCNC: 140 MMOL/L (ref 136–145)
SODIUM BLD-SCNC: 140 MMOL/L (ref 136–145)
SODIUM BLD-SCNC: 142 MMOL/L (ref 136–145)
SODIUM BLDA-SCNC: 138 MMOL/L (ref 138–146)
SODIUM BLDA-SCNC: 138 MMOL/L (ref 138–146)
SODIUM BLDA-SCNC: 139 MMOL/L (ref 138–146)
SODIUM BLDA-SCNC: 140 MMOL/L (ref 138–146)
SODIUM BLDA-SCNC: 143 MMOL/L (ref 138–146)
TOTAL RATE: 0 BREATHS/MINUTE
TRIGL SERPL-MCNC: 69 MG/DL (ref 0–150)
VENTILATOR MODE: ABNORMAL
VLDLC SERPL-MCNC: 13.8 MG/DL
WBC NRBC COR # BLD: 16.55 10*3/MM3 (ref 3.4–10.8)
WBC NRBC COR # BLD: 19.22 10*3/MM3 (ref 3.4–10.8)
WBC NRBC COR # BLD: 25.85 10*3/MM3 (ref 3.4–10.8)

## 2020-05-29 PROCEDURE — 94799 UNLISTED PULMONARY SVC/PX: CPT

## 2020-05-29 PROCEDURE — 25010000002 PROPOFOL 10 MG/ML EMULSION: Performed by: ANESTHESIOLOGY

## 2020-05-29 PROCEDURE — 99233 SBSQ HOSP IP/OBS HIGH 50: CPT | Performed by: INTERNAL MEDICINE

## 2020-05-29 PROCEDURE — C1894 INTRO/SHEATH, NON-LASER: HCPCS | Performed by: THORACIC SURGERY (CARDIOTHORACIC VASCULAR SURGERY)

## 2020-05-29 PROCEDURE — 71045 X-RAY EXAM CHEST 1 VIEW: CPT

## 2020-05-29 PROCEDURE — 88305 TISSUE EXAM BY PATHOLOGIST: CPT | Performed by: THORACIC SURGERY (CARDIOTHORACIC VASCULAR SURGERY)

## 2020-05-29 PROCEDURE — 86901 BLOOD TYPING SEROLOGIC RH(D): CPT

## 2020-05-29 PROCEDURE — 84132 ASSAY OF SERUM POTASSIUM: CPT

## 2020-05-29 PROCEDURE — 85027 COMPLETE CBC AUTOMATED: CPT | Performed by: THORACIC SURGERY (CARDIOTHORACIC VASCULAR SURGERY)

## 2020-05-29 PROCEDURE — 25010000002 PROTAMINE SULFATE PER 10 MG: Performed by: ANESTHESIOLOGY

## 2020-05-29 PROCEDURE — 83735 ASSAY OF MAGNESIUM: CPT | Performed by: PHYSICIAN ASSISTANT

## 2020-05-29 PROCEDURE — 82330 ASSAY OF CALCIUM: CPT | Performed by: PHYSICIAN ASSISTANT

## 2020-05-29 PROCEDURE — 25010000002 HEPARIN (PORCINE) PER 1000 UNITS: Performed by: ANESTHESIOLOGY

## 2020-05-29 PROCEDURE — C1751 CATH, INF, PER/CENT/MIDLINE: HCPCS | Performed by: THORACIC SURGERY (CARDIOTHORACIC VASCULAR SURGERY)

## 2020-05-29 PROCEDURE — 84702 CHORIONIC GONADOTROPIN TEST: CPT | Performed by: ANESTHESIOLOGY

## 2020-05-29 PROCEDURE — 25010000002 ALBUMIN HUMAN 5% PER 50 ML: Performed by: ANESTHESIOLOGY

## 2020-05-29 PROCEDURE — 94002 VENT MGMT INPAT INIT DAY: CPT

## 2020-05-29 PROCEDURE — 25010000003 CEFUROXIME SODIUM 1.5 G RECONSTITUTED SOLUTION: Performed by: PHYSICIAN ASSISTANT

## 2020-05-29 PROCEDURE — C1751 CATH, INF, PER/CENT/MIDLINE: HCPCS | Performed by: ANESTHESIOLOGY

## 2020-05-29 PROCEDURE — 93005 ELECTROCARDIOGRAM TRACING: CPT | Performed by: PHYSICIAN ASSISTANT

## 2020-05-29 PROCEDURE — 02RF08Z REPLACEMENT OF AORTIC VALVE WITH ZOOPLASTIC TISSUE, OPEN APPROACH: ICD-10-PCS | Performed by: THORACIC SURGERY (CARDIOTHORACIC VASCULAR SURGERY)

## 2020-05-29 PROCEDURE — 85347 COAGULATION TIME ACTIVATED: CPT

## 2020-05-29 PROCEDURE — 94770: CPT

## 2020-05-29 PROCEDURE — 25010000002 MIDAZOLAM PER 1 MG: Performed by: ANESTHESIOLOGY

## 2020-05-29 PROCEDURE — 82803 BLOOD GASES ANY COMBINATION: CPT

## 2020-05-29 PROCEDURE — 85610 PROTHROMBIN TIME: CPT | Performed by: PHYSICIAN ASSISTANT

## 2020-05-29 PROCEDURE — 85014 HEMATOCRIT: CPT

## 2020-05-29 PROCEDURE — 86900 BLOOD TYPING SEROLOGIC ABO: CPT

## 2020-05-29 PROCEDURE — 80061 LIPID PANEL: CPT | Performed by: INTERNAL MEDICINE

## 2020-05-29 PROCEDURE — 25010000002 PROPOFOL 10 MG/ML EMULSION: Performed by: THORACIC SURGERY (CARDIOTHORACIC VASCULAR SURGERY)

## 2020-05-29 PROCEDURE — 82947 ASSAY GLUCOSE BLOOD QUANT: CPT

## 2020-05-29 PROCEDURE — P9041 ALBUMIN (HUMAN),5%, 50ML: HCPCS | Performed by: ANESTHESIOLOGY

## 2020-05-29 PROCEDURE — 93318 ECHO TRANSESOPHAGEAL INTRAOP: CPT | Performed by: ANESTHESIOLOGY

## 2020-05-29 PROCEDURE — P9041 ALBUMIN (HUMAN),5%, 50ML: HCPCS

## 2020-05-29 PROCEDURE — 85027 COMPLETE CBC AUTOMATED: CPT | Performed by: PHYSICIAN ASSISTANT

## 2020-05-29 PROCEDURE — 25810000003 DEXTROSE 5 % WITH KCL 20 MEQ 20-5 MEQ/L-% SOLUTION: Performed by: PHYSICIAN ASSISTANT

## 2020-05-29 PROCEDURE — 84295 ASSAY OF SERUM SODIUM: CPT

## 2020-05-29 PROCEDURE — 33405 REPLACEMENT AORTIC VALVE OPN: CPT | Performed by: THORACIC SURGERY (CARDIOTHORACIC VASCULAR SURGERY)

## 2020-05-29 PROCEDURE — 80069 RENAL FUNCTION PANEL: CPT | Performed by: THORACIC SURGERY (CARDIOTHORACIC VASCULAR SURGERY)

## 2020-05-29 PROCEDURE — 82805 BLOOD GASES W/O2 SATURATION: CPT

## 2020-05-29 PROCEDURE — 85730 THROMBOPLASTIN TIME PARTIAL: CPT | Performed by: PHYSICIAN ASSISTANT

## 2020-05-29 PROCEDURE — 25010000002 FENTANYL CITRATE (PF) 100 MCG/2ML SOLUTION: Performed by: ANESTHESIOLOGY

## 2020-05-29 PROCEDURE — 83735 ASSAY OF MAGNESIUM: CPT | Performed by: THORACIC SURGERY (CARDIOTHORACIC VASCULAR SURGERY)

## 2020-05-29 PROCEDURE — 5A1221Z PERFORMANCE OF CARDIAC OUTPUT, CONTINUOUS: ICD-10-PCS | Performed by: THORACIC SURGERY (CARDIOTHORACIC VASCULAR SURGERY)

## 2020-05-29 PROCEDURE — 3E080GC INTRODUCTION OF OTHER THERAPEUTIC SUBSTANCE INTO HEART, OPEN APPROACH: ICD-10-PCS | Performed by: THORACIC SURGERY (CARDIOTHORACIC VASCULAR SURGERY)

## 2020-05-29 PROCEDURE — 25010000002 ALBUMIN HUMAN 5% PER 50 ML

## 2020-05-29 PROCEDURE — 99222 1ST HOSP IP/OBS MODERATE 55: CPT | Performed by: INTERNAL MEDICINE

## 2020-05-29 PROCEDURE — 80069 RENAL FUNCTION PANEL: CPT | Performed by: PHYSICIAN ASSISTANT

## 2020-05-29 PROCEDURE — 82330 ASSAY OF CALCIUM: CPT

## 2020-05-29 DEVICE — IMPLANTABLE DEVICE: Type: IMPLANTABLE DEVICE | Site: HEART | Status: FUNCTIONAL

## 2020-05-29 RX ORDER — PROTAMINE SULFATE 10 MG/ML
INJECTION, SOLUTION INTRAVENOUS AS NEEDED
Status: DISCONTINUED | OUTPATIENT
Start: 2020-05-29 | End: 2020-05-29 | Stop reason: SURG

## 2020-05-29 RX ORDER — CHLORHEXIDINE GLUCONATE 0.12 MG/ML
15 RINSE ORAL EVERY 12 HOURS SCHEDULED
Status: DISCONTINUED | OUTPATIENT
Start: 2020-05-29 | End: 2020-05-30

## 2020-05-29 RX ORDER — SODIUM CHLORIDE 0.9 % (FLUSH) 0.9 %
30 SYRINGE (ML) INJECTION ONCE AS NEEDED
Status: COMPLETED | OUTPATIENT
Start: 2020-05-29 | End: 2020-05-29

## 2020-05-29 RX ORDER — SODIUM CHLORIDE 9 MG/ML
30 INJECTION, SOLUTION INTRAVENOUS CONTINUOUS PRN
Status: DISCONTINUED | OUTPATIENT
Start: 2020-05-29 | End: 2020-06-03 | Stop reason: HOSPADM

## 2020-05-29 RX ORDER — ASPIRIN 325 MG
325 TABLET, DELAYED RELEASE (ENTERIC COATED) ORAL DAILY
Status: DISCONTINUED | OUTPATIENT
Start: 2020-05-30 | End: 2020-06-03 | Stop reason: HOSPADM

## 2020-05-29 RX ORDER — POTASSIUM CHLORIDE 750 MG/1
40 CAPSULE, EXTENDED RELEASE ORAL AS NEEDED
Status: DISCONTINUED | OUTPATIENT
Start: 2020-05-29 | End: 2020-06-03 | Stop reason: HOSPADM

## 2020-05-29 RX ORDER — SODIUM CHLORIDE, SODIUM LACTATE, POTASSIUM CHLORIDE, CALCIUM CHLORIDE 600; 310; 30; 20 MG/100ML; MG/100ML; MG/100ML; MG/100ML
9 INJECTION, SOLUTION INTRAVENOUS CONTINUOUS
Status: DISCONTINUED | OUTPATIENT
Start: 2020-05-29 | End: 2020-05-29

## 2020-05-29 RX ORDER — ROCURONIUM BROMIDE 10 MG/ML
INJECTION, SOLUTION INTRAVENOUS AS NEEDED
Status: DISCONTINUED | OUTPATIENT
Start: 2020-05-29 | End: 2020-05-29 | Stop reason: SURG

## 2020-05-29 RX ORDER — POTASSIUM CHLORIDE 29.8 MG/ML
20 INJECTION INTRAVENOUS
Status: DISCONTINUED | OUTPATIENT
Start: 2020-05-29 | End: 2020-06-03 | Stop reason: HOSPADM

## 2020-05-29 RX ORDER — LIDOCAINE HYDROCHLORIDE 10 MG/ML
0.5 INJECTION, SOLUTION EPIDURAL; INFILTRATION; INTRACAUDAL; PERINEURAL ONCE AS NEEDED
Status: DISCONTINUED | OUTPATIENT
Start: 2020-05-29 | End: 2020-05-29 | Stop reason: HOSPADM

## 2020-05-29 RX ORDER — LIDOCAINE HYDROCHLORIDE 10 MG/ML
0.2 INJECTION, SOLUTION INFILTRATION; PERINEURAL ONCE
Status: DISCONTINUED | OUTPATIENT
Start: 2020-05-29 | End: 2020-05-29

## 2020-05-29 RX ORDER — SODIUM CHLORIDE 0.9 % (FLUSH) 0.9 %
10 SYRINGE (ML) INJECTION EVERY 12 HOURS SCHEDULED
Status: DISCONTINUED | OUTPATIENT
Start: 2020-05-29 | End: 2020-05-29

## 2020-05-29 RX ORDER — NOREPINEPHRINE BIT/0.9 % NACL 8 MG/250ML
.02-.3 INFUSION BOTTLE (ML) INTRAVENOUS CONTINUOUS PRN
Status: DISCONTINUED | OUTPATIENT
Start: 2020-05-29 | End: 2020-06-01

## 2020-05-29 RX ORDER — OXYCODONE HYDROCHLORIDE AND ACETAMINOPHEN 5; 325 MG/1; MG/1
2 TABLET ORAL EVERY 4 HOURS PRN
Status: DISCONTINUED | OUTPATIENT
Start: 2020-05-29 | End: 2020-06-03 | Stop reason: HOSPADM

## 2020-05-29 RX ORDER — VECURONIUM BROMIDE 1 MG/ML
INJECTION, POWDER, LYOPHILIZED, FOR SOLUTION INTRAVENOUS AS NEEDED
Status: DISCONTINUED | OUTPATIENT
Start: 2020-05-29 | End: 2020-05-29 | Stop reason: SURG

## 2020-05-29 RX ORDER — ACETAMINOPHEN 325 MG/1
650 TABLET ORAL EVERY 4 HOURS PRN
Status: DISCONTINUED | OUTPATIENT
Start: 2020-05-29 | End: 2020-06-03 | Stop reason: HOSPADM

## 2020-05-29 RX ORDER — ASPIRIN 325 MG
325 TABLET ORAL ONCE
Status: COMPLETED | OUTPATIENT
Start: 2020-05-29 | End: 2020-05-29

## 2020-05-29 RX ORDER — DOPAMINE HYDROCHLORIDE 160 MG/100ML
2-20 INJECTION, SOLUTION INTRAVENOUS CONTINUOUS PRN
Status: DISCONTINUED | OUTPATIENT
Start: 2020-05-29 | End: 2020-06-01

## 2020-05-29 RX ORDER — SODIUM CHLORIDE 9 MG/ML
INJECTION, SOLUTION INTRAVENOUS CONTINUOUS PRN
Status: DISCONTINUED | OUTPATIENT
Start: 2020-05-29 | End: 2020-05-29 | Stop reason: SURG

## 2020-05-29 RX ORDER — PROTAMINE SULFATE 10 MG/ML
50 INJECTION, SOLUTION INTRAVENOUS ONCE
Status: DISCONTINUED | OUTPATIENT
Start: 2020-05-29 | End: 2020-05-29

## 2020-05-29 RX ORDER — MEPERIDINE HYDROCHLORIDE 25 MG/ML
25 INJECTION INTRAMUSCULAR; INTRAVENOUS; SUBCUTANEOUS EVERY 4 HOURS PRN
Status: ACTIVE | OUTPATIENT
Start: 2020-05-29 | End: 2020-06-01

## 2020-05-29 RX ORDER — FAMOTIDINE 10 MG/ML
20 INJECTION, SOLUTION INTRAVENOUS ONCE
Status: DISCONTINUED | OUTPATIENT
Start: 2020-05-29 | End: 2020-05-29

## 2020-05-29 RX ORDER — ALBUMIN, HUMAN INJ 5% 5 %
SOLUTION INTRAVENOUS CONTINUOUS PRN
Status: DISCONTINUED | OUTPATIENT
Start: 2020-05-29 | End: 2020-05-29 | Stop reason: SURG

## 2020-05-29 RX ORDER — POTASSIUM CHLORIDE, DEXTROSE MONOHYDRATE 150; 5 MG/100ML; G/100ML
30 INJECTION, SOLUTION INTRAVENOUS CONTINUOUS
Status: DISCONTINUED | OUTPATIENT
Start: 2020-05-29 | End: 2020-06-03 | Stop reason: HOSPADM

## 2020-05-29 RX ORDER — FAMOTIDINE 20 MG/1
20 TABLET, FILM COATED ORAL ONCE
Status: COMPLETED | OUTPATIENT
Start: 2020-05-29 | End: 2020-05-29

## 2020-05-29 RX ORDER — ALBUTEROL SULFATE 2.5 MG/3ML
2.5 SOLUTION RESPIRATORY (INHALATION) EVERY 4 HOURS PRN
Status: DISCONTINUED | OUTPATIENT
Start: 2020-05-29 | End: 2020-05-30

## 2020-05-29 RX ORDER — HYDROCODONE BITARTRATE AND ACETAMINOPHEN 7.5; 325 MG/1; MG/1
1 TABLET ORAL EVERY 4 HOURS PRN
Status: DISCONTINUED | OUTPATIENT
Start: 2020-05-29 | End: 2020-06-03 | Stop reason: HOSPADM

## 2020-05-29 RX ORDER — CALCIUM CHLORIDE 100 MG/ML
INJECTION INTRAVENOUS; INTRAVENTRICULAR AS NEEDED
Status: DISCONTINUED | OUTPATIENT
Start: 2020-05-29 | End: 2020-05-29 | Stop reason: SURG

## 2020-05-29 RX ORDER — ALBUMIN, HUMAN INJ 5% 5 %
SOLUTION INTRAVENOUS
Status: COMPLETED
Start: 2020-05-29 | End: 2020-05-29

## 2020-05-29 RX ORDER — NITROGLYCERIN 0.4 MG/1
0.4 TABLET SUBLINGUAL
Status: DISCONTINUED | OUTPATIENT
Start: 2020-05-29 | End: 2020-05-29 | Stop reason: HOSPADM

## 2020-05-29 RX ORDER — SODIUM CHLORIDE 9 MG/ML
100 INJECTION, SOLUTION INTRAVENOUS CONTINUOUS
Status: DISCONTINUED | OUTPATIENT
Start: 2020-05-29 | End: 2020-05-29

## 2020-05-29 RX ORDER — ONDANSETRON 2 MG/ML
4 INJECTION INTRAMUSCULAR; INTRAVENOUS EVERY 6 HOURS PRN
Status: DISCONTINUED | OUTPATIENT
Start: 2020-05-29 | End: 2020-06-03 | Stop reason: HOSPADM

## 2020-05-29 RX ORDER — NITROGLYCERIN 20 MG/100ML
5-200 INJECTION INTRAVENOUS CONTINUOUS PRN
Status: DISCONTINUED | OUTPATIENT
Start: 2020-05-29 | End: 2020-06-01

## 2020-05-29 RX ORDER — ATORVASTATIN CALCIUM 40 MG/1
40 TABLET, FILM COATED ORAL NIGHTLY
Status: DISCONTINUED | OUTPATIENT
Start: 2020-05-29 | End: 2020-06-03 | Stop reason: HOSPADM

## 2020-05-29 RX ORDER — MORPHINE SULFATE 2 MG/ML
2 INJECTION, SOLUTION INTRAMUSCULAR; INTRAVENOUS
Status: DISCONTINUED | OUTPATIENT
Start: 2020-05-29 | End: 2020-06-03 | Stop reason: HOSPADM

## 2020-05-29 RX ORDER — SODIUM CHLORIDE 9 MG/ML
INJECTION, SOLUTION INTRAVENOUS AS NEEDED
Status: DISCONTINUED | OUTPATIENT
Start: 2020-05-29 | End: 2020-05-29 | Stop reason: HOSPADM

## 2020-05-29 RX ORDER — SODIUM CHLORIDE 0.9 % (FLUSH) 0.9 %
10 SYRINGE (ML) INJECTION AS NEEDED
Status: DISCONTINUED | OUTPATIENT
Start: 2020-05-29 | End: 2020-05-29

## 2020-05-29 RX ORDER — MIDAZOLAM HYDROCHLORIDE 1 MG/ML
INJECTION INTRAMUSCULAR; INTRAVENOUS AS NEEDED
Status: DISCONTINUED | OUTPATIENT
Start: 2020-05-29 | End: 2020-05-29 | Stop reason: SURG

## 2020-05-29 RX ORDER — NALOXONE HYDROCHLORIDE 0.4 MG/ML
0.2 INJECTION, SOLUTION INTRAMUSCULAR; INTRAVENOUS; SUBCUTANEOUS
Status: DISCONTINUED | OUTPATIENT
Start: 2020-05-29 | End: 2020-06-03 | Stop reason: HOSPADM

## 2020-05-29 RX ORDER — PHENYLEPHRINE HCL IN 0.9% NACL 0.5 MG/5ML
.5-3 SYRINGE (ML) INTRAVENOUS CONTINUOUS PRN
Status: DISCONTINUED | OUTPATIENT
Start: 2020-05-29 | End: 2020-05-30

## 2020-05-29 RX ORDER — FENTANYL CITRATE 50 UG/ML
25 INJECTION, SOLUTION INTRAMUSCULAR; INTRAVENOUS
Status: DISCONTINUED | OUTPATIENT
Start: 2020-05-29 | End: 2020-06-03 | Stop reason: HOSPADM

## 2020-05-29 RX ORDER — SODIUM CHLORIDE, SODIUM LACTATE, POTASSIUM CHLORIDE, CALCIUM CHLORIDE 600; 310; 30; 20 MG/100ML; MG/100ML; MG/100ML; MG/100ML
9 INJECTION, SOLUTION INTRAVENOUS CONTINUOUS
Status: DISCONTINUED | OUTPATIENT
Start: 2020-05-29 | End: 2020-06-03 | Stop reason: HOSPADM

## 2020-05-29 RX ORDER — POTASSIUM CHLORIDE 1.5 G/1.77G
40 POWDER, FOR SOLUTION ORAL AS NEEDED
Status: DISCONTINUED | OUTPATIENT
Start: 2020-05-29 | End: 2020-06-03 | Stop reason: HOSPADM

## 2020-05-29 RX ORDER — FENTANYL CITRATE 50 UG/ML
INJECTION, SOLUTION INTRAMUSCULAR; INTRAVENOUS AS NEEDED
Status: DISCONTINUED | OUTPATIENT
Start: 2020-05-29 | End: 2020-05-29 | Stop reason: SURG

## 2020-05-29 RX ORDER — PROPOFOL 10 MG/ML
VIAL (ML) INTRAVENOUS CONTINUOUS PRN
Status: DISCONTINUED | OUTPATIENT
Start: 2020-05-29 | End: 2020-05-29 | Stop reason: SURG

## 2020-05-29 RX ORDER — ACETAMINOPHEN 325 MG/1
650 TABLET ORAL EVERY 4 HOURS PRN
Status: DISCONTINUED | OUTPATIENT
Start: 2020-05-29 | End: 2020-05-29 | Stop reason: HOSPADM

## 2020-05-29 RX ORDER — METOPROLOL TARTRATE 5 MG/5ML
2.5 INJECTION INTRAVENOUS EVERY 6 HOURS SCHEDULED
Status: DISCONTINUED | OUTPATIENT
Start: 2020-05-29 | End: 2020-05-30

## 2020-05-29 RX ORDER — ETOMIDATE 2 MG/ML
INJECTION INTRAVENOUS AS NEEDED
Status: DISCONTINUED | OUTPATIENT
Start: 2020-05-29 | End: 2020-05-29 | Stop reason: SURG

## 2020-05-29 RX ORDER — MAGNESIUM HYDROXIDE 1200 MG/15ML
LIQUID ORAL AS NEEDED
Status: DISCONTINUED | OUTPATIENT
Start: 2020-05-29 | End: 2020-05-29 | Stop reason: HOSPADM

## 2020-05-29 RX ORDER — DOBUTAMINE HYDROCHLORIDE 100 MG/100ML
2-20 INJECTION INTRAVENOUS CONTINUOUS PRN
Status: DISCONTINUED | OUTPATIENT
Start: 2020-05-29 | End: 2020-05-30

## 2020-05-29 RX ORDER — AMINOCAPROIC ACID 250 MG/ML
INJECTION, SOLUTION INTRAVENOUS AS NEEDED
Status: DISCONTINUED | OUTPATIENT
Start: 2020-05-29 | End: 2020-05-29 | Stop reason: SURG

## 2020-05-29 RX ORDER — CHLORHEXIDINE GLUCONATE 0.12 MG/ML
15 RINSE ORAL ONCE
Status: COMPLETED | OUTPATIENT
Start: 2020-05-29 | End: 2020-05-29

## 2020-05-29 RX ORDER — ALBUMIN, HUMAN INJ 5% 5 %
500 SOLUTION INTRAVENOUS AS NEEDED
Status: DISCONTINUED | OUTPATIENT
Start: 2020-05-29 | End: 2020-06-03 | Stop reason: HOSPADM

## 2020-05-29 RX ORDER — DEXMEDETOMIDINE HYDROCHLORIDE 4 UG/ML
.2-1.5 INJECTION, SOLUTION INTRAVENOUS CONTINUOUS PRN
Status: DISCONTINUED | OUTPATIENT
Start: 2020-05-29 | End: 2020-06-03 | Stop reason: HOSPADM

## 2020-05-29 RX ORDER — SUFENTANIL CITRATE 50 UG/ML
INJECTION EPIDURAL; INTRAVENOUS AS NEEDED
Status: DISCONTINUED | OUTPATIENT
Start: 2020-05-29 | End: 2020-05-29 | Stop reason: SURG

## 2020-05-29 RX ORDER — NITROGLYCERIN 20 MG/100ML
INJECTION INTRAVENOUS CONTINUOUS PRN
Status: DISCONTINUED | OUTPATIENT
Start: 2020-05-29 | End: 2020-05-29 | Stop reason: SURG

## 2020-05-29 RX ORDER — HEPARIN SODIUM 1000 [USP'U]/ML
INJECTION, SOLUTION INTRAVENOUS; SUBCUTANEOUS AS NEEDED
Status: DISCONTINUED | OUTPATIENT
Start: 2020-05-29 | End: 2020-05-29 | Stop reason: SURG

## 2020-05-29 RX ORDER — FAMOTIDINE 20 MG/1
20 TABLET, FILM COATED ORAL ONCE
Status: DISCONTINUED | OUTPATIENT
Start: 2020-05-29 | End: 2020-05-29

## 2020-05-29 RX ADMIN — SODIUM CHLORIDE, POTASSIUM CHLORIDE, SODIUM LACTATE AND CALCIUM CHLORIDE 9 ML/HR: 600; 310; 30; 20 INJECTION, SOLUTION INTRAVENOUS at 05:50

## 2020-05-29 RX ADMIN — MIDAZOLAM 2 MG: 1 INJECTION INTRAMUSCULAR; INTRAVENOUS at 07:21

## 2020-05-29 RX ADMIN — MUPIROCIN 1 APPLICATION: 20 OINTMENT TOPICAL at 06:10

## 2020-05-29 RX ADMIN — AMINOCAPROIC ACID 10 G: 250 INJECTION, SOLUTION INTRAVENOUS at 10:29

## 2020-05-29 RX ADMIN — CHLORHEXIDINE GLUCONATE 0.12% ORAL RINSE 15 ML: 1.2 LIQUID ORAL at 06:09

## 2020-05-29 RX ADMIN — PROTAMINE SULFATE 300 MG: 10 INJECTION, SOLUTION INTRAVENOUS at 10:29

## 2020-05-29 RX ADMIN — ALBUMIN HUMAN: 0.05 INJECTION, SOLUTION INTRAVENOUS at 11:05

## 2020-05-29 RX ADMIN — AMINOCAPROIC ACID 10 G: 250 INJECTION, SOLUTION INTRAVENOUS at 07:46

## 2020-05-29 RX ADMIN — SUFENTANIL CITRATE 50 MCG: 50 INJECTION EPIDURAL; INTRAVENOUS at 10:10

## 2020-05-29 RX ADMIN — HEPARIN SODIUM 50000 UNITS: 1000 INJECTION, SOLUTION INTRAVENOUS; SUBCUTANEOUS at 08:46

## 2020-05-29 RX ADMIN — VECURONIUM BROMIDE 50 MG: 1 INJECTION, POWDER, LYOPHILIZED, FOR SOLUTION INTRAVENOUS at 09:03

## 2020-05-29 RX ADMIN — FENTANYL CITRATE 100 MCG: 50 INJECTION, SOLUTION INTRAMUSCULAR; INTRAVENOUS at 07:23

## 2020-05-29 RX ADMIN — SUFENTANIL CITRATE 25 MCG: 50 INJECTION EPIDURAL; INTRAVENOUS at 07:31

## 2020-05-29 RX ADMIN — NITROGLYCERIN 0.2 MCG/KG/MIN: 20 INJECTION INTRAVENOUS at 11:05

## 2020-05-29 RX ADMIN — SUFENTANIL CITRATE 25 MCG: 50 INJECTION EPIDURAL; INTRAVENOUS at 07:28

## 2020-05-29 RX ADMIN — SUFENTANIL CITRATE 50 MCG: 50 INJECTION EPIDURAL; INTRAVENOUS at 09:03

## 2020-05-29 RX ADMIN — CHLORHEXIDINE GLUCONATE 0.12% ORAL RINSE 15 ML: 1.2 LIQUID ORAL at 13:36

## 2020-05-29 RX ADMIN — ALBUMIN, HUMAN INJ 5% 500 ML: 5 SOLUTION at 11:40

## 2020-05-29 RX ADMIN — ASPIRIN 325 MG ORAL TABLET 325 MG: 325 PILL ORAL at 13:36

## 2020-05-29 RX ADMIN — CHLORHEXIDINE GLUCONATE 0.12% ORAL RINSE 15 ML: 1.2 LIQUID ORAL at 20:51

## 2020-05-29 RX ADMIN — SODIUM CHLORIDE: 9 INJECTION, SOLUTION INTRAVENOUS at 07:18

## 2020-05-29 RX ADMIN — INSULIN HUMAN 2.4 UNITS/HR: 1 INJECTION, SOLUTION INTRAVENOUS at 14:00

## 2020-05-29 RX ADMIN — ALBUMIN HUMAN 500 ML: 0.05 INJECTION, SOLUTION INTRAVENOUS at 11:40

## 2020-05-29 RX ADMIN — DEXMEDETOMIDINE HYDROCHLORIDE 0.2 MCG/KG/HR: 4 INJECTION, SOLUTION INTRAVENOUS at 16:00

## 2020-05-29 RX ADMIN — SODIUM BICARBONATE 50 MEQ: 84 INJECTION, SOLUTION INTRAVENOUS at 17:03

## 2020-05-29 RX ADMIN — PROPOFOL 25 MCG/KG/MIN: 10 INJECTION, EMULSION INTRAVENOUS at 11:05

## 2020-05-29 RX ADMIN — ROCURONIUM BROMIDE 100 MG: 10 INJECTION INTRAVENOUS at 07:25

## 2020-05-29 RX ADMIN — SUFENTANIL CITRATE 100 MCG: 50 INJECTION EPIDURAL; INTRAVENOUS at 08:17

## 2020-05-29 RX ADMIN — FAMOTIDINE 20 MG: 20 TABLET, FILM COATED ORAL at 06:07

## 2020-05-29 RX ADMIN — CEFUROXIME SODIUM 1.5 G: 1.5 INJECTION, POWDER, FOR SOLUTION INTRAVENOUS at 18:47

## 2020-05-29 RX ADMIN — ETOMIDATE 42.1 MG: 2 INJECTION, SOLUTION INTRAVENOUS at 07:25

## 2020-05-29 RX ADMIN — CALCIUM CHLORIDE 1 G: 100 INJECTION INTRAVENOUS; INTRAVENTRICULAR at 10:29

## 2020-05-29 RX ADMIN — HYDROCODONE BITARTRATE AND ACETAMINOPHEN 1 TABLET: 7.5; 325 TABLET ORAL at 22:55

## 2020-05-29 RX ADMIN — PROPOFOL 50 MCG/KG/MIN: 10 INJECTION, EMULSION INTRAVENOUS at 13:36

## 2020-05-29 RX ADMIN — ATORVASTATIN CALCIUM 40 MG: 40 TABLET, FILM COATED ORAL at 20:51

## 2020-05-29 RX ADMIN — Medication 0.02 MCG/KG/MIN: at 12:00

## 2020-05-29 RX ADMIN — POTASSIUM CHLORIDE AND DEXTROSE MONOHYDRATE 30 ML/HR: 150; 5 INJECTION, SOLUTION INTRAVENOUS at 11:29

## 2020-05-29 RX ADMIN — SODIUM CHLORIDE, PRESERVATIVE FREE 10 ML: 5 INJECTION INTRAVENOUS at 13:39

## 2020-05-29 NOTE — ANESTHESIA PROCEDURE NOTES
Arterial Line      Patient reassessed immediately prior to procedure    Patient location during procedure: pre-op  Start time: 5/29/2020 6:40 AM  Stop Time:5/29/2020 6:55 AM       Line placed for hemodynamic monitoring.  Performed By   Anesthesiologist: Rancho Almeida MD  Preanesthetic Checklist  Completed: patient identified, site marked, surgical consent, pre-op evaluation, timeout performed, IV checked, risks and benefits discussed and monitors and equipment checked  Arterial Line Prep   Sterile Tech: cap, gloves and sterile barriers  Prep: ChloraPrep  Patient monitoring: blood pressure monitoring, continuous pulse oximetry and EKG  Arterial Line Procedure   Laterality:right  Location:  radial artery  Catheter size: 20 G   Guidance: palpation technique  Number of attempts: 1  Successful placement: yes  Post Assessment   Dressing Type: line sutured, occlusive dressing applied, secured with tape and wrist guard applied.   Complications no  Circ/Move/Sens Assessment: normal and unchanged.   Patient Tolerance: patient tolerated the procedure well with no apparent complications

## 2020-05-29 NOTE — ANESTHESIA PROCEDURE NOTES
Central Line      Patient reassessed immediately prior to procedure    Patient location during procedure: OR  Start time: 5/29/2020 7:32 AM  Stop Time:5/29/2020 7:44 AM  Indications: vascular access  Staff  Anesthesiologist: Rancho Almeida MD  Preanesthetic Checklist  Completed: patient identified, site marked, surgical consent, pre-op evaluation, timeout performed, IV checked, risks and benefits discussed and monitors and equipment checked  Central Line Prep  Sterile Tech:cap, gloves, gown, mask and sterile barriers  Prep: chloraprep  Patient monitoring: blood pressure monitoring, continuous pulse oximetry and EKG  Central Line Procedure  Laterality:right  Location:internal jugular  Catheter Type:Cordis and [place]ta  Catheter Size:9 Fr  Guidance:ultrasound guided  PROCEDURE NOTE/ULTRASOUND INTERPRETATION.  Using ultrasound guidance the potential vascular sites for insertion of the catheter were visualized to determine the patency of the vessel to be used for vascular access.  After selecting the appropriate site for insertion, the needle was visualized under ultrasound being inserted into the internal jugular vein, followed by ultrasound confirmation of wire and catheter placement. There were no abnormalities seen on ultrasound; an image was taken; and the patient tolerated the procedure with no complications. Images: still images obtained, printed/placed on chart  Assessment  Post procedure:biopatch applied, line sutured, occlusive dressing applied and secured with tape  Assessement:blood return through all ports, free fluid flow and chest x-ray ordered  Complications:no  Patient Tolerance:patient tolerated the procedure well with no apparent complications

## 2020-05-29 NOTE — ANESTHESIA PREPROCEDURE EVALUATION
Anesthesia Evaluation     Patient summary reviewed and Nursing notes reviewed   NPO Solid Status: > 8 hours  NPO Liquid Status: > 8 hours           Airway   Mallampati: II  TM distance: >3 FB  Neck ROM: full  No difficulty expected  Dental      Pulmonary    (+) decreased breath sounds,   Cardiovascular   Exercise tolerance: poor (<4 METS)    Rhythm: regular  Rate: normal        Neuro/Psych  GI/Hepatic/Renal/Endo      Musculoskeletal     Abdominal    Substance History      OB/GYN          Other                        Anesthesia Plan    ASA 4     general     intravenous induction     Anesthetic plan, all risks, benefits, and alternatives have been provided, discussed and informed consent has been obtained with: patient.    A line pa cath  López return to icu post op

## 2020-05-29 NOTE — ANESTHESIA PROCEDURE NOTES
Procedure Performed: Emergent/Open-Heart Anesthesia ROSANNA      Start Time:        End Time:   5/29/2020 9:52 AM    Preanesthesia Checklist:  Patient identified, IV assessed, risks and benefits discussed, monitors and equipment assessed, procedure being performed at surgeon's request and anesthesia consent obtained.    General Procedure Information  Diagnostic Indications for Echo:  assessment of surgical repair and hemodynamic monitoring  Physician Requesting Echo: Kumar Ramirez MD  Location performed:  OR  Intubated  Bite block not placed  Heart visualized  Probe Insertion:  Easy  Probe Type:  Multiplane  Modalities:  2D only, color flow mapping, continuous wave Doppler and pulse wave Doppler    Echocardiographic and Doppler Measurements    Ventricles    Right Ventricle:  Cavity size normal.  Hypertrophy not present.  Thrombus not present.  Global function normal.    Left Ventricle:  Cavity size normal.  Diastolic dimension 5.5 cm.  Hypertrophy not present.  Thrombus not present.  Global Function normal.      Ventricular Regional Function:  1- Basal Anteroseptal:  normal  2- Basal Anterior:  normal  3- Basal Anterolateral:  normal  4- Basal Inferolateral:  normal  5- Basal Inferior:  normal  6- Basal Inferoseptal:  normal  7- Mid Anteroseptal:  normal  8- Mid Anterior:  normal  9- Mid Anterolateral:  normal  10- Mid Inferolateral:  normal  11- Mid Inferior:  normal  12- Mid Inferoseptal:  normal  13- Apical Anterior:  normal  14- Apical Lateral:  normal  15- Apical Inferior:  normal  16- Apical Septal:  normal  17- [place]:  normal      Valves    Aortic Valve:  Annulus normal.  Stenosis not present.  Regurgitation moderate.  Leaflets calcified and bicuspid.  Leaflet motions restricted.      Mitral Valve:  Annulus normal.  Stenosis severe.  Area: 1.07 cm².  Mean Gradient: 39 mmHg.  Vena Contracta Width: 0.7 cm.  Regurgitation trace.  Leaflets normal.  Leaflet motions normal.      Tricuspid Valve:  Annulus normal.   Stenosis not present.  Regurgitation absent.  Leaflets normal.    Pulmonic Valve:  Annulus normal.  Stenosis not present.  Regurgitation absent.  Leaflets calcified.          Aorta    Ascending Aorta:  Size normal.  Dissection not present.  Plaque thickness less than 3 mm.  Mobile plaque not present.    Aortic Arch:  Size normal.  Dissection not present.  Plaque thickness less than 3 mm.  Mobile plaque not present.    Descending Aorta:  Size normal.  Dissection not present.  Plaque thickness less than 3 mm.  Mobile plaque not present.          Atria    Right Atrium:  Size normal.  Spontaneous echo contrast not present.  Thrombus not present.  Tumor not present.  Device not present.      Left Atrium:  Size normal.  Spontaneous echo contrast not present.  Thrombus not present.  Tumor not present.  Device not present.    Left atrial appendage normal.      Septa    Atrial Septum:  Intra-atrial septal morphology normal.      Ventricular Septum:  Intra-ventricular septum morphology normal.      Diastolic Function Measurements:  Diastolic Dysfunction Grade= III  E= 134 ms  A=  24 ms  E/A Ratio=  5.4  DT=  151 ms  S/D=  0.6  IVRT=    Other Findings  Pericardium:  pericardial effusion  Pleural Effusion:  none  Pulmonary Arteries:  normal  Pulmonary Venous Flow:  normal    Anesthesia Information  Performed Personally      Echocardiogram Comments:       Post bypass bileaflet mechanical valve in aortic position no paravalvular leak both leadlets mobile  Mean gradient 14 mm hg  Small central ai leak   good lvf

## 2020-05-29 NOTE — ANESTHESIA POSTPROCEDURE EVALUATION
Patient: Luisa Conteh    Procedure Summary     Date:  05/29/20 Room / Location:   [facility] [address] [place] [address] [facility]    Anesthesia Start:  0718 Anesthesia Stop:  1136    Procedure:  Mediansternotomy, AORTIC VALVE REPLACEMENT WITH Transesophageal echocardiogram (N/A Chest) Diagnosis:       Aortic stenosis with bicuspid valve      (Aortic stenosis with bicuspid valve [Q23.0, Q23.1])    Surgeon:  Kumar Ramirez MD Provider:  Rancho Almeida MD    Anesthesia Type:  general ASA Status:  4          Anesthesia Type: general    Vitals  Vitals Value Taken Time   BP 86/50 5/29/2020 11:31 AM   Temp     Pulse 79 5/29/2020 11:35 AM   Resp     SpO2 100 % 5/29/2020 11:35 AM   Vitals shown include unvalidated device data.        Post Anesthesia Care and Evaluation    Patient location during evaluation: ICU  Patient participation: complete - patient cannot participate  Level of consciousness: obtunded/minimal responses  Pain score: 0  Pain management: adequate  Airway patency: patent  Anesthetic complications: No anesthetic complications  PONV Status: none  Cardiovascular status: acceptable  Respiratory status: acceptable  Hydration status: acceptable

## 2020-05-29 NOTE — ANESTHESIA PROCEDURE NOTES
Airway  Urgency: elective    Date/Time: 5/29/2020 7:26 AM  End Time:5/29/2020 7:26 AM  Airway not difficult    General Information and Staff    Patient location during procedure: OR    Indications and Patient Condition  Indications for airway management: airway protection    Preoxygenated: yes  MILS not maintained throughout  Mask difficulty assessment: 1 - vent by mask    Final Airway Details  Final airway type: endotracheal airway      Successful airway: ETT  Cuffed: yes   Successful intubation technique: direct laryngoscopy  Endotracheal tube insertion site: oral  Blade: Darlin  Blade size: 3  ETT size (mm): 7.0  Cormack-Lehane Classification: grade I - full view of glottis  Placement verified by: chest auscultation and capnometry   Measured from: lips  ETT/EBT  to lips (cm): 20  Number of attempts at approach: 1    Additional Comments  Negative epigastric sounds, Breath sound equal bilaterally with symmetric chest rise and fall

## 2020-05-30 ENCOUNTER — APPOINTMENT (OUTPATIENT)
Dept: GENERAL RADIOLOGY | Facility: HOSPITAL | Age: 50
End: 2020-05-30

## 2020-05-30 LAB
ALBUMIN SERPL-MCNC: 4.2 G/DL (ref 3.5–5.2)
ANION GAP SERPL CALCULATED.3IONS-SCNC: 13 MMOL/L (ref 5–15)
ANION GAP SERPL CALCULATED.3IONS-SCNC: 16 MMOL/L (ref 5–15)
BASOPHILS # BLD AUTO: 0.04 10*3/MM3 (ref 0–0.2)
BASOPHILS NFR BLD AUTO: 0.2 % (ref 0–1.5)
BH BB BLOOD EXPIRATION DATE: NORMAL
BH BB BLOOD EXPIRATION DATE: NORMAL
BH BB BLOOD TYPE BARCODE: 5100
BH BB BLOOD TYPE BARCODE: 5100
BH BB DISPENSE STATUS: NORMAL
BH BB DISPENSE STATUS: NORMAL
BH BB PRODUCT CODE: NORMAL
BH BB PRODUCT CODE: NORMAL
BH BB UNIT NUMBER: NORMAL
BH BB UNIT NUMBER: NORMAL
BUN BLD-MCNC: 16 MG/DL (ref 6–20)
BUN BLD-MCNC: 16 MG/DL (ref 6–20)
BUN/CREAT SERPL: 18 (ref 7–25)
BUN/CREAT SERPL: 18.8 (ref 7–25)
CALCIUM SPEC-SCNC: 8.7 MG/DL (ref 8.6–10.5)
CALCIUM SPEC-SCNC: 8.8 MG/DL (ref 8.6–10.5)
CHLORIDE SERPL-SCNC: 103 MMOL/L (ref 98–107)
CHLORIDE SERPL-SCNC: 107 MMOL/L (ref 98–107)
CO2 SERPL-SCNC: 20 MMOL/L (ref 22–29)
CO2 SERPL-SCNC: 22 MMOL/L (ref 22–29)
CREAT BLD-MCNC: 0.85 MG/DL (ref 0.57–1)
CREAT BLD-MCNC: 0.89 MG/DL (ref 0.57–1)
CROSSMATCH INTERPRETATION: NORMAL
CROSSMATCH INTERPRETATION: NORMAL
DEPRECATED RDW RBC AUTO: 51.6 FL (ref 37–54)
DEPRECATED RDW RBC AUTO: 51.9 FL (ref 37–54)
EOSINOPHIL # BLD AUTO: 0 10*3/MM3 (ref 0–0.4)
EOSINOPHIL NFR BLD AUTO: 0 % (ref 0.3–6.2)
ERYTHROCYTE [DISTWIDTH] IN BLOOD BY AUTOMATED COUNT: 16.6 % (ref 12.3–15.4)
ERYTHROCYTE [DISTWIDTH] IN BLOOD BY AUTOMATED COUNT: 16.8 % (ref 12.3–15.4)
GFR SERPL CREATININE-BSD FRML MDRD: 67 ML/MIN/1.73
GFR SERPL CREATININE-BSD FRML MDRD: 71 ML/MIN/1.73
GLUCOSE BLD-MCNC: 139 MG/DL (ref 65–99)
GLUCOSE BLD-MCNC: 140 MG/DL (ref 65–99)
GLUCOSE BLDC GLUCOMTR-MCNC: 106 MG/DL (ref 70–130)
GLUCOSE BLDC GLUCOMTR-MCNC: 112 MG/DL (ref 70–130)
GLUCOSE BLDC GLUCOMTR-MCNC: 120 MG/DL (ref 70–130)
GLUCOSE BLDC GLUCOMTR-MCNC: 124 MG/DL (ref 70–130)
GLUCOSE BLDC GLUCOMTR-MCNC: 125 MG/DL (ref 70–130)
GLUCOSE BLDC GLUCOMTR-MCNC: 126 MG/DL (ref 70–130)
GLUCOSE BLDC GLUCOMTR-MCNC: 128 MG/DL (ref 70–130)
GLUCOSE BLDC GLUCOMTR-MCNC: 130 MG/DL (ref 70–130)
GLUCOSE BLDC GLUCOMTR-MCNC: 131 MG/DL (ref 70–130)
GLUCOSE BLDC GLUCOMTR-MCNC: 132 MG/DL (ref 70–130)
GLUCOSE BLDC GLUCOMTR-MCNC: 134 MG/DL (ref 70–130)
GLUCOSE BLDC GLUCOMTR-MCNC: 135 MG/DL (ref 70–130)
GLUCOSE BLDC GLUCOMTR-MCNC: 138 MG/DL (ref 70–130)
GLUCOSE BLDC GLUCOMTR-MCNC: 139 MG/DL (ref 70–130)
GLUCOSE BLDC GLUCOMTR-MCNC: 144 MG/DL (ref 70–130)
GLUCOSE BLDC GLUCOMTR-MCNC: 148 MG/DL (ref 70–130)
GLUCOSE BLDC GLUCOMTR-MCNC: 153 MG/DL (ref 70–130)
HCT VFR BLD AUTO: 40.4 % (ref 34–46.6)
HCT VFR BLD AUTO: 40.8 % (ref 34–46.6)
HGB BLD-MCNC: 12.2 G/DL (ref 12–15.9)
HGB BLD-MCNC: 12.2 G/DL (ref 12–15.9)
IMM GRANULOCYTES # BLD AUTO: 0.11 10*3/MM3 (ref 0–0.05)
IMM GRANULOCYTES NFR BLD AUTO: 0.6 % (ref 0–0.5)
INR PPP: 1.24 (ref 0.85–1.16)
LYMPHOCYTES # BLD AUTO: 0.8 10*3/MM3 (ref 0.7–3.1)
LYMPHOCYTES NFR BLD AUTO: 4.6 % (ref 19.6–45.3)
MAGNESIUM SERPL-MCNC: 2.8 MG/DL (ref 1.6–2.6)
MCH RBC QN AUTO: 25.5 PG (ref 26.6–33)
MCH RBC QN AUTO: 25.6 PG (ref 26.6–33)
MCHC RBC AUTO-ENTMCNC: 29.9 G/DL (ref 31.5–35.7)
MCHC RBC AUTO-ENTMCNC: 30.2 G/DL (ref 31.5–35.7)
MCV RBC AUTO: 84.9 FL (ref 79–97)
MCV RBC AUTO: 85.2 FL (ref 79–97)
MONOCYTES # BLD AUTO: 1.24 10*3/MM3 (ref 0.1–0.9)
MONOCYTES NFR BLD AUTO: 7.1 % (ref 5–12)
NEUTROPHILS # BLD AUTO: 15.17 10*3/MM3 (ref 1.7–7)
NEUTROPHILS NFR BLD AUTO: 87.5 % (ref 42.7–76)
NRBC BLD AUTO-RTO: 0 /100 WBC (ref 0–0.2)
PHOSPHATE SERPL-MCNC: 3.8 MG/DL (ref 2.5–4.5)
PLATELET # BLD AUTO: 123 10*3/MM3 (ref 140–450)
PLATELET # BLD AUTO: 139 10*3/MM3 (ref 140–450)
PMV BLD AUTO: 10.5 FL (ref 6–12)
PMV BLD AUTO: 11.1 FL (ref 6–12)
POTASSIUM BLD-SCNC: 4.5 MMOL/L (ref 3.5–5.2)
POTASSIUM BLD-SCNC: 4.6 MMOL/L (ref 3.5–5.2)
PROTHROMBIN TIME: 15.3 SECONDS (ref 11.5–14)
RBC # BLD AUTO: 4.76 10*6/MM3 (ref 3.77–5.28)
RBC # BLD AUTO: 4.79 10*6/MM3 (ref 3.77–5.28)
SODIUM BLD-SCNC: 139 MMOL/L (ref 136–145)
SODIUM BLD-SCNC: 142 MMOL/L (ref 136–145)
UNIT  ABO: NORMAL
UNIT  ABO: NORMAL
UNIT  RH: NORMAL
UNIT  RH: NORMAL
WBC NRBC COR # BLD: 16.69 10*3/MM3 (ref 3.4–10.8)
WBC NRBC COR # BLD: 17.36 10*3/MM3 (ref 3.4–10.8)

## 2020-05-30 PROCEDURE — 85610 PROTHROMBIN TIME: CPT | Performed by: PHYSICIAN ASSISTANT

## 2020-05-30 PROCEDURE — 82962 GLUCOSE BLOOD TEST: CPT

## 2020-05-30 PROCEDURE — 93010 ELECTROCARDIOGRAM REPORT: CPT | Performed by: INTERNAL MEDICINE

## 2020-05-30 PROCEDURE — 25010000002 MORPHINE PER 10 MG: Performed by: THORACIC SURGERY (CARDIOTHORACIC VASCULAR SURGERY)

## 2020-05-30 PROCEDURE — 25010000002 ONDANSETRON PER 1 MG: Performed by: PHYSICIAN ASSISTANT

## 2020-05-30 PROCEDURE — 85025 COMPLETE CBC W/AUTO DIFF WBC: CPT | Performed by: PHYSICIAN ASSISTANT

## 2020-05-30 PROCEDURE — 99024 POSTOP FOLLOW-UP VISIT: CPT | Performed by: THORACIC SURGERY (CARDIOTHORACIC VASCULAR SURGERY)

## 2020-05-30 PROCEDURE — 25010000003 CEFUROXIME SODIUM 1.5 G RECONSTITUTED SOLUTION: Performed by: PHYSICIAN ASSISTANT

## 2020-05-30 PROCEDURE — 97161 PT EVAL LOW COMPLEX 20 MIN: CPT

## 2020-05-30 PROCEDURE — 93005 ELECTROCARDIOGRAM TRACING: CPT | Performed by: PHYSICIAN ASSISTANT

## 2020-05-30 PROCEDURE — 71045 X-RAY EXAM CHEST 1 VIEW: CPT

## 2020-05-30 PROCEDURE — 99232 SBSQ HOSP IP/OBS MODERATE 35: CPT | Performed by: INTERNAL MEDICINE

## 2020-05-30 PROCEDURE — 94660 CPAP INITIATION&MGMT: CPT

## 2020-05-30 PROCEDURE — 94799 UNLISTED PULMONARY SVC/PX: CPT

## 2020-05-30 PROCEDURE — 80048 BASIC METABOLIC PNL TOTAL CA: CPT | Performed by: THORACIC SURGERY (CARDIOTHORACIC VASCULAR SURGERY)

## 2020-05-30 RX ORDER — DEXTROSE MONOHYDRATE 25 G/50ML
25 INJECTION, SOLUTION INTRAVENOUS
Status: DISCONTINUED | OUTPATIENT
Start: 2020-05-30 | End: 2020-06-03 | Stop reason: HOSPADM

## 2020-05-30 RX ORDER — NICOTINE POLACRILEX 4 MG
15 LOZENGE BUCCAL
Status: DISCONTINUED | OUTPATIENT
Start: 2020-05-30 | End: 2020-06-03 | Stop reason: HOSPADM

## 2020-05-30 RX ADMIN — CHLORHEXIDINE GLUCONATE 0.12% ORAL RINSE 15 ML: 1.2 LIQUID ORAL at 08:56

## 2020-05-30 RX ADMIN — METOPROLOL TARTRATE 12.5 MG: 25 TABLET, FILM COATED ORAL at 21:27

## 2020-05-30 RX ADMIN — METOPROLOL TARTRATE 12.5 MG: 25 TABLET, FILM COATED ORAL at 08:56

## 2020-05-30 RX ADMIN — MORPHINE SULFATE 2 MG: 2 INJECTION, SOLUTION INTRAMUSCULAR; INTRAVENOUS at 11:18

## 2020-05-30 RX ADMIN — OXYCODONE HYDROCHLORIDE AND ACETAMINOPHEN 2 TABLET: 5; 325 TABLET ORAL at 21:32

## 2020-05-30 RX ADMIN — OXYCODONE HYDROCHLORIDE AND ACETAMINOPHEN 2 TABLET: 5; 325 TABLET ORAL at 15:05

## 2020-05-30 RX ADMIN — METOPROLOL TARTRATE 2.5 MG: 5 INJECTION INTRAVENOUS at 05:09

## 2020-05-30 RX ADMIN — CEFUROXIME SODIUM 1.5 G: 1.5 INJECTION, POWDER, FOR SOLUTION INTRAVENOUS at 18:30

## 2020-05-30 RX ADMIN — ATORVASTATIN CALCIUM 40 MG: 40 TABLET, FILM COATED ORAL at 21:28

## 2020-05-30 RX ADMIN — CEFUROXIME SODIUM 1.5 G: 1.5 INJECTION, POWDER, FOR SOLUTION INTRAVENOUS at 12:07

## 2020-05-30 RX ADMIN — OXYCODONE HYDROCHLORIDE AND ACETAMINOPHEN 2 TABLET: 5; 325 TABLET ORAL at 07:39

## 2020-05-30 RX ADMIN — CEFUROXIME SODIUM 1.5 G: 1.5 INJECTION, POWDER, FOR SOLUTION INTRAVENOUS at 04:45

## 2020-05-30 RX ADMIN — ONDANSETRON 4 MG: 2 INJECTION INTRAMUSCULAR; INTRAVENOUS at 21:32

## 2020-05-30 RX ADMIN — ASPIRIN 325 MG: 325 TABLET, COATED ORAL at 08:56

## 2020-05-30 RX ADMIN — INSULIN HUMAN 4.2 UNITS/HR: 1 INJECTION, SOLUTION INTRAVENOUS at 06:44

## 2020-05-30 RX ADMIN — NITROGLYCERIN 75 MCG/MIN: 20 INJECTION INTRAVENOUS at 17:11

## 2020-05-31 ENCOUNTER — APPOINTMENT (OUTPATIENT)
Dept: GENERAL RADIOLOGY | Facility: HOSPITAL | Age: 50
End: 2020-05-31

## 2020-05-31 LAB
ANION GAP SERPL CALCULATED.3IONS-SCNC: 11 MMOL/L (ref 5–15)
BUN BLD-MCNC: 14 MG/DL (ref 6–20)
BUN/CREAT SERPL: 17.9 (ref 7–25)
CALCIUM SPEC-SCNC: 8.7 MG/DL (ref 8.6–10.5)
CHLORIDE SERPL-SCNC: 100 MMOL/L (ref 98–107)
CO2 SERPL-SCNC: 26 MMOL/L (ref 22–29)
CREAT BLD-MCNC: 0.78 MG/DL (ref 0.57–1)
DEPRECATED RDW RBC AUTO: 52.3 FL (ref 37–54)
ERYTHROCYTE [DISTWIDTH] IN BLOOD BY AUTOMATED COUNT: 16.8 % (ref 12.3–15.4)
GFR SERPL CREATININE-BSD FRML MDRD: 78 ML/MIN/1.73
GLUCOSE BLD-MCNC: 139 MG/DL (ref 65–99)
GLUCOSE BLDC GLUCOMTR-MCNC: 132 MG/DL (ref 70–130)
GLUCOSE BLDC GLUCOMTR-MCNC: 133 MG/DL (ref 70–130)
GLUCOSE BLDC GLUCOMTR-MCNC: 178 MG/DL (ref 70–130)
HCT VFR BLD AUTO: 37.3 % (ref 34–46.6)
HGB BLD-MCNC: 11.1 G/DL (ref 12–15.9)
MCH RBC QN AUTO: 25.4 PG (ref 26.6–33)
MCHC RBC AUTO-ENTMCNC: 29.8 G/DL (ref 31.5–35.7)
MCV RBC AUTO: 85.4 FL (ref 79–97)
PLATELET # BLD AUTO: 125 10*3/MM3 (ref 140–450)
PMV BLD AUTO: 11.2 FL (ref 6–12)
POTASSIUM BLD-SCNC: 4.4 MMOL/L (ref 3.5–5.2)
RBC # BLD AUTO: 4.37 10*6/MM3 (ref 3.77–5.28)
SODIUM BLD-SCNC: 137 MMOL/L (ref 136–145)
WBC NRBC COR # BLD: 16.2 10*3/MM3 (ref 3.4–10.8)

## 2020-05-31 PROCEDURE — 99232 SBSQ HOSP IP/OBS MODERATE 35: CPT | Performed by: INTERNAL MEDICINE

## 2020-05-31 PROCEDURE — 80048 BASIC METABOLIC PNL TOTAL CA: CPT | Performed by: PHYSICIAN ASSISTANT

## 2020-05-31 PROCEDURE — 25010000002 ONDANSETRON PER 1 MG: Performed by: PHYSICIAN ASSISTANT

## 2020-05-31 PROCEDURE — 82962 GLUCOSE BLOOD TEST: CPT

## 2020-05-31 PROCEDURE — 93005 ELECTROCARDIOGRAM TRACING: CPT | Performed by: PHYSICIAN ASSISTANT

## 2020-05-31 PROCEDURE — 94660 CPAP INITIATION&MGMT: CPT

## 2020-05-31 PROCEDURE — 94799 UNLISTED PULMONARY SVC/PX: CPT

## 2020-05-31 PROCEDURE — 97530 THERAPEUTIC ACTIVITIES: CPT

## 2020-05-31 PROCEDURE — 85027 COMPLETE CBC AUTOMATED: CPT | Performed by: PHYSICIAN ASSISTANT

## 2020-05-31 PROCEDURE — 25010000003 CEFUROXIME SODIUM 1.5 G RECONSTITUTED SOLUTION: Performed by: PHYSICIAN ASSISTANT

## 2020-05-31 PROCEDURE — 99024 POSTOP FOLLOW-UP VISIT: CPT | Performed by: THORACIC SURGERY (CARDIOTHORACIC VASCULAR SURGERY)

## 2020-05-31 PROCEDURE — 93010 ELECTROCARDIOGRAM REPORT: CPT | Performed by: INTERNAL MEDICINE

## 2020-05-31 PROCEDURE — 25010000002 FUROSEMIDE PER 20 MG: Performed by: INTERNAL MEDICINE

## 2020-05-31 PROCEDURE — 71045 X-RAY EXAM CHEST 1 VIEW: CPT

## 2020-05-31 RX ORDER — FUROSEMIDE 10 MG/ML
40 INJECTION INTRAMUSCULAR; INTRAVENOUS ONCE
Status: COMPLETED | OUTPATIENT
Start: 2020-05-31 | End: 2020-05-31

## 2020-05-31 RX ORDER — LISINOPRIL 10 MG/1
10 TABLET ORAL
Status: DISCONTINUED | OUTPATIENT
Start: 2020-05-31 | End: 2020-06-03 | Stop reason: HOSPADM

## 2020-05-31 RX ADMIN — ONDANSETRON 4 MG: 2 INJECTION INTRAMUSCULAR; INTRAVENOUS at 17:25

## 2020-05-31 RX ADMIN — HYDROCODONE BITARTRATE AND ACETAMINOPHEN 1 TABLET: 7.5; 325 TABLET ORAL at 20:01

## 2020-05-31 RX ADMIN — METOPROLOL TARTRATE 25 MG: 25 TABLET, FILM COATED ORAL at 20:01

## 2020-05-31 RX ADMIN — LISINOPRIL 10 MG: 10 TABLET ORAL at 17:15

## 2020-05-31 RX ADMIN — METOPROLOL TARTRATE 12.5 MG: 25 TABLET, FILM COATED ORAL at 08:21

## 2020-05-31 RX ADMIN — ATORVASTATIN CALCIUM 40 MG: 40 TABLET, FILM COATED ORAL at 20:01

## 2020-05-31 RX ADMIN — FUROSEMIDE 40 MG: 10 INJECTION, SOLUTION INTRAMUSCULAR; INTRAVENOUS at 12:15

## 2020-05-31 RX ADMIN — CEFUROXIME SODIUM 1.5 G: 1.5 INJECTION, POWDER, FOR SOLUTION INTRAVENOUS at 02:38

## 2020-05-31 RX ADMIN — HYDROCODONE BITARTRATE AND ACETAMINOPHEN 1 TABLET: 7.5; 325 TABLET ORAL at 05:35

## 2020-05-31 RX ADMIN — ASPIRIN 325 MG: 325 TABLET, COATED ORAL at 08:21

## 2020-05-31 RX ADMIN — NITROGLYCERIN 50 MCG/MIN: 20 INJECTION INTRAVENOUS at 03:31

## 2020-06-01 PROBLEM — I35.9 AORTIC VALVE DISORDER: Status: RESOLVED | Noted: 2020-05-29 | Resolved: 2020-06-01

## 2020-06-01 LAB
ANION GAP SERPL CALCULATED.3IONS-SCNC: 10 MMOL/L (ref 5–15)
BUN BLD-MCNC: 17 MG/DL (ref 6–20)
BUN/CREAT SERPL: 20.2 (ref 7–25)
CALCIUM SPEC-SCNC: 8.6 MG/DL (ref 8.6–10.5)
CHLORIDE SERPL-SCNC: 101 MMOL/L (ref 98–107)
CO2 SERPL-SCNC: 26 MMOL/L (ref 22–29)
CREAT BLD-MCNC: 0.84 MG/DL (ref 0.57–1)
CYTO UR: NORMAL
DEPRECATED RDW RBC AUTO: 51.8 FL (ref 37–54)
ERYTHROCYTE [DISTWIDTH] IN BLOOD BY AUTOMATED COUNT: 16.8 % (ref 12.3–15.4)
GFR SERPL CREATININE-BSD FRML MDRD: 72 ML/MIN/1.73
GLUCOSE BLD-MCNC: 118 MG/DL (ref 65–99)
GLUCOSE BLDC GLUCOMTR-MCNC: 109 MG/DL (ref 70–130)
GLUCOSE BLDC GLUCOMTR-MCNC: 117 MG/DL (ref 70–130)
GLUCOSE BLDC GLUCOMTR-MCNC: 118 MG/DL (ref 70–130)
GLUCOSE BLDC GLUCOMTR-MCNC: 124 MG/DL (ref 70–130)
HCT VFR BLD AUTO: 38.8 % (ref 34–46.6)
HGB BLD-MCNC: 11.5 G/DL (ref 12–15.9)
INR PPP: 1.21 (ref 0.85–1.16)
LAB AP CASE REPORT: NORMAL
LAB AP CLINICAL INFORMATION: NORMAL
MCH RBC QN AUTO: 25.2 PG (ref 26.6–33)
MCHC RBC AUTO-ENTMCNC: 29.6 G/DL (ref 31.5–35.7)
MCV RBC AUTO: 85.1 FL (ref 79–97)
PATH REPORT.FINAL DX SPEC: NORMAL
PATH REPORT.GROSS SPEC: NORMAL
PLATELET # BLD AUTO: 139 10*3/MM3 (ref 140–450)
PMV BLD AUTO: 10.4 FL (ref 6–12)
POTASSIUM BLD-SCNC: 4.1 MMOL/L (ref 3.5–5.2)
PROTHROMBIN TIME: 15 SECONDS (ref 11.5–14)
RBC # BLD AUTO: 4.56 10*6/MM3 (ref 3.77–5.28)
SODIUM BLD-SCNC: 137 MMOL/L (ref 136–145)
WBC NRBC COR # BLD: 14.52 10*3/MM3 (ref 3.4–10.8)

## 2020-06-01 PROCEDURE — 85027 COMPLETE CBC AUTOMATED: CPT | Performed by: PHYSICIAN ASSISTANT

## 2020-06-01 PROCEDURE — 99024 POSTOP FOLLOW-UP VISIT: CPT | Performed by: THORACIC SURGERY (CARDIOTHORACIC VASCULAR SURGERY)

## 2020-06-01 PROCEDURE — 99231 SBSQ HOSP IP/OBS SF/LOW 25: CPT | Performed by: NURSE PRACTITIONER

## 2020-06-01 PROCEDURE — 80048 BASIC METABOLIC PNL TOTAL CA: CPT | Performed by: PHYSICIAN ASSISTANT

## 2020-06-01 PROCEDURE — 82962 GLUCOSE BLOOD TEST: CPT

## 2020-06-01 PROCEDURE — 94660 CPAP INITIATION&MGMT: CPT

## 2020-06-01 PROCEDURE — 25010000002 FUROSEMIDE PER 20 MG: Performed by: NURSE PRACTITIONER

## 2020-06-01 PROCEDURE — 94799 UNLISTED PULMONARY SVC/PX: CPT

## 2020-06-01 PROCEDURE — 85610 PROTHROMBIN TIME: CPT | Performed by: PHYSICIAN ASSISTANT

## 2020-06-01 RX ORDER — WARFARIN SODIUM 2.5 MG/1
2.5 TABLET ORAL
Status: DISCONTINUED | OUTPATIENT
Start: 2020-06-01 | End: 2020-06-01 | Stop reason: SDUPTHER

## 2020-06-01 RX ORDER — WARFARIN SODIUM 7.5 MG/1
7.5 TABLET ORAL
Status: DISCONTINUED | OUTPATIENT
Start: 2020-06-01 | End: 2020-06-01 | Stop reason: ALTCHOICE

## 2020-06-01 RX ORDER — FUROSEMIDE 10 MG/ML
40 INJECTION INTRAMUSCULAR; INTRAVENOUS ONCE
Status: DISCONTINUED | OUTPATIENT
Start: 2020-06-01 | End: 2020-06-01

## 2020-06-01 RX ORDER — FUROSEMIDE 10 MG/ML
40 INJECTION INTRAMUSCULAR; INTRAVENOUS ONCE
Status: COMPLETED | OUTPATIENT
Start: 2020-06-01 | End: 2020-06-01

## 2020-06-01 RX ORDER — WARFARIN SODIUM 5 MG/1
5 TABLET ORAL
Status: DISCONTINUED | OUTPATIENT
Start: 2020-06-01 | End: 2020-06-02

## 2020-06-01 RX ADMIN — HYDROCODONE BITARTRATE AND ACETAMINOPHEN 1 TABLET: 7.5; 325 TABLET ORAL at 22:59

## 2020-06-01 RX ADMIN — METOPROLOL TARTRATE 25 MG: 25 TABLET, FILM COATED ORAL at 08:51

## 2020-06-01 RX ADMIN — LISINOPRIL 10 MG: 10 TABLET ORAL at 08:50

## 2020-06-01 RX ADMIN — FUROSEMIDE 40 MG: 10 INJECTION, SOLUTION INTRAMUSCULAR; INTRAVENOUS at 11:57

## 2020-06-01 RX ADMIN — ASPIRIN 325 MG: 325 TABLET, COATED ORAL at 08:51

## 2020-06-01 RX ADMIN — METOPROLOL TARTRATE 25 MG: 25 TABLET, FILM COATED ORAL at 20:18

## 2020-06-01 RX ADMIN — WARFARIN SODIUM 5 MG: 5 TABLET ORAL at 17:31

## 2020-06-01 RX ADMIN — ATORVASTATIN CALCIUM 40 MG: 40 TABLET, FILM COATED ORAL at 20:18

## 2020-06-01 NOTE — NURSING NOTE
No issues overnight. Ambulated 220 ft in palafox with gait belt and standby assistance. Minimal c/o pain. Wore BIPAP while sleeping. SBP <140mmHg through night, tolerated increased lopressor dose. Home Lisinopril restarted yesterday. Coumadin to be started per cardiology. Pulmonary hygiene encouraged, patient using IS independently when awake. Continues to be mildly tachypneic. Denies SOA. Weaned to 2L NC for SpO2 >90%. Transferred to  room 487 at 0600 this AM. Report given to JERRY Deluna R.N.

## 2020-06-01 NOTE — PROGRESS NOTES
Discharge Planning Assessment  [facility]     Patient Name: Luisa Conteh  MRN: [id number]  Today's Date: 6/1/2020    Admit Date: 5/29/2020    Discharge Needs Assessment     Row Name 06/01/20 0937       Living Environment    Lives With  spouse    Current Living Arrangements  home/apartment/condo    Primary Care Provided by  self    Able to Return to Prior Arrangements  yes       Resource/Environmental Concerns    Transportation Concerns  car, none       Transition Planning    Patient/Family Anticipates Transition to  home with family    Patient/Family Anticipated Services at Transition  none    Transportation Anticipated  family or friend will provide       Discharge Needs Assessment    Readmission Within the Last 30 Days  no previous admission in last 30 days    Concerns to be Addressed  no discharge needs identified;denies needs/concerns at this time    Equipment Currently Used at Home  bipap/cpap    Anticipated Changes Related to Illness  none    Equipment Needed After Discharge  none    Current Discharge Risk  chronically ill        Discharge Plan     Row Name 06/01/20 0939       Plan    Plan  home    Patient/Family in Agreement with Plan  yes    Plan Comments  I met with Mrs. Conteh at bedside. She lives with her  in [facility].  She is independent with ADL's.  She uses Cpap at night.  She plans on returning home afte this admission. Sister will transport her home by car. Denies having any discharge needs.      Final Discharge Disposition Code  01 - home or self-care        Destination      Coordination has not been started for this encounter.      Durable Medical Equipment      Coordination has not been started for this encounter.      Dialysis/Infusion      Coordination has not been started for this encounter.      Home Medical Care      Coordination has not been started for this encounter.      Therapy      Coordination has not been started for this encounter.      [facility]       Coordination has not been started for this encounter.          Demographic Summary     Row Name 06/01/20 0936       General Information    Admission Type  inpatient    Reason for Consult  discharge planning        Functional Status     Row Name 06/01/20 0937       Functional Status, IADL    Medications  independent    Meal Preparation  independent    Housekeeping  independent    Laundry  independent    Shopping  independent        Psychosocial    No documentation.       Abuse/Neglect    No documentation.       Legal    No documentation.       Substance Abuse    No documentation.       Patient Forms    No documentation.           Bria Machado RN

## 2020-06-01 NOTE — PLAN OF CARE
Problem: Patient Care Overview  Goal: Plan of Care Review  Outcome: Ongoing (interventions implemented as appropriate)  Flowsheets  Taken 6/1/2020 1620  Progress: improving  Outcome Summary: VSS. Pt has been up in chair most of the day. Pt has walked once today and is about to walk again. Pt on 1L NC. IV Lasix 1 time dose given 40mg. Warfarin started. Plan is to get pt stronger and get Wafarin theraputic. Potential d/c Wednesday 6/3/2020.  Taken 6/1/2020 0850  Plan of Care Reviewed With: patient

## 2020-06-01 NOTE — PROGRESS NOTES
"Pharmacy Consult  -  Warfarin    Luisa Conteh is a  49 y.o. female   Height - 175.3 cm (69.02\")  Weight - (!) 140 kg (308 lb 10.3 oz)    Consulting Provider: - NAVA Barahona)  Indication: - Aortic Mechanical Valve Replacement ([place] Valve)  Goal INR: - 2.5-3.5  Home Regimen:   - New Start    Bridge Therapy: No    Drug-Drug Interactions with current regimen:  Aspirin - may increase risk of bleeding    Warfarin Dosing During Admission:    Date  6/1           INR  1.21           Dose  (5mg)                Education Provided: Patient is a new start on warfarin. Education provided on 6/1 verbally and in writing. Discussed effects of warfarin, importance of checking INR, drug-drug and drug-food interactions, and signs/symptoms of bleeding and clotting. Patient verbalized understanding through teach back. All pertinent questions were answered.      Discharge Follow up:   Following Provider - New Start to Warfarin   Follow up time range or appointment - 3-5 days after discharge    Labs:    Results from last 7 days   Lab Units 06/01/20  0901 06/01/20 0415 05/31/20 0330 05/30/20  0341 05/30/20  0338 05/29/20  2351 05/29/20  1942 05/29/20  1547 05/29/20  1144  05/26/20  1310   INR  1.21*  --   --  1.24*  --   --   --   --  1.45*  --  1.09   APTT seconds  --   --   --   --   --   --   --   --  29.8  --  30.3   HEMOGLOBIN g/dL  --  11.5* 11.1*  --  12.2 12.2 12.6 12.7 12.6  --  14.1   HEMOGLOBIN, POC   --   --   --   --   --   --   --   --   --    < >  --    HEMATOCRIT %  --  38.8 37.3  --  40.8 40.4 41.2 42.7 41.9  --  46.2   HEMATOCRIT POC   --   --   --   --   --   --   --   --   --    < >  --     < > = values in this interval not displayed.     Results from last 7 days   Lab Units 06/01/20 0415 05/31/20  0330 05/30/20  0546  05/26/20  1310   SODIUM mmol/L 137 137 139   < > 137   POTASSIUM mmol/L 4.1 4.4 4.5   < > 4.2   CHLORIDE mmol/L 101 100 103   < > 100   CO2 mmol/L 26.0 26.0 20.0*   < > 24.0   BUN mg/dL 17 " 14 16   < > 16   CREATININE mg/dL 0.84 0.78 0.85   < > 0.94   CALCIUM mg/dL 8.6 8.7 8.7   < > 8.9   BILIRUBIN mg/dL  --   --   --   --  0.3   ALK PHOS U/L  --   --   --   --  91   ALT (SGPT) U/L  --   --   --   --  11   AST (SGOT) U/L  --   --   --   --  15   GLUCOSE mg/dL 118* 139* 140*   < > 100*    < > = values in this interval not displayed.       Current dietary intake: % of documented meals  Dietary Orders (From admission, onward)       Start     Ordered    05/30/20 1000  Diet Regular; Cardiac, Consistent Carbohydrate  Diet Effective 1000     Question Answer Comment   Diet Texture / Consistency Regular    Common Modifiers Cardiac    Common Modifiers Consistent Carbohydrate        05/30/20 0837                    Assessment/Plan:   Pharmacy to dose warfarin for Aortic Mechanical Valve Replacement. Goal INR 2.5-3.5.   Patient's INR is subtherapeutic currently at 1.21.  New start warfarin patient will start patient on warfarin 5 mg tonight.   H/H - 11.5/38.8, Diet: % documented meals, no major drug-drug interactions  Will monitor signs/symptoms of bleeding, dietary intake, and drug-drug interactions.   Will follow daily PT/INR and adjust dose accordingly    Pharmacy will continue to follow.     Thank you  Roberto Ortega RPH  6/1/2020  14:45

## 2020-06-01 NOTE — PROGRESS NOTES
[facility] at [facility]  Cardiology Progress Note      Chief Complaint/Reason for service:    · Severe aortic stenosis/bicuspid aortic valve/status post AVR  · Hypertension  · Pulmonary hypertension  · Chronic diastolic heart failure       Patient was transferred to telemetry yesterday.  She has been ambulating the halls without difficulty.  Her Coumadin was restarted.  INR subtherapeutic 1.21 today.      Past medical, surgical, social and family history reviewed in the patient's electronic medical record.         Vital Sign Min/Max for last 24 hours  Temp  Min: 97.8 °F (36.6 °C)  Max: 98.2 °F (36.8 °C)   BP  Min: 106/64  Max: 140/71   Pulse  Min: 76  Max: 100   Resp  Min: 16  Max: 28   SpO2  Min: 92 %  Max: 99 %   Flow (L/min)  Min: 2  Max: 3      Intake/Output Summary (Last 24 hours) at 6/1/2020 1054  Last data filed at 6/1/2020 0400  Gross per 24 hour   Intake 551 ml   Output 1250 ml   Net -699 ml           Physical Exam   Constitutional: She is oriented to person, place, and time. She appears well-developed and well-nourished.   HENT:   Head: Normocephalic.   Neck: No JVD present. Carotid bruit is not present.   Cardiovascular: Normal rate, regular rhythm, normal heart sounds and intact distal pulses. Exam reveals no gallop and no friction rub.   No murmur heard.  Pulmonary/Chest: Effort normal. She has decreased breath sounds. She has rhonchi. She has rales.   Abdominal: Soft.   Musculoskeletal: She exhibits no edema.   Neurological: She is alert and oriented to person, place, and time.   Skin: Skin is warm and dry. No cyanosis. Nails show no clubbing.   Psychiatric: She has a normal mood and affect. Her behavior is normal.       Tele: Normal sinus rhythm    Results Review (reviewed the patient's recent labs in the electronic medical record):     EKG (6/1/2020): Normal sinus rhythm  ST/T wave abnormality inferior lateral leads    CXR (5/31/2020): No interval change.  Cardiac size is  enlarged.  Bibasilar opacifications are minimal and similar to prior chest x-ray.  No pneumothorax or pleural effusion      Intraoperative ROSANNA (5/29/2020): No perivalvular leak of mechanical aortic valve.  Mean gradient 14 mmHg.  Grade 3 diastolic dysfunction      Results from last 7 days   Lab Units 06/01/20 0415 05/31/20  0330 05/30/20  0546 05/29/20  2351 05/29/20 2006 05/29/20  1547 05/29/20  1144   SODIUM mmol/L 137 137 139 142 142 140 140   POTASSIUM mmol/L 4.1 4.4 4.5 4.6 4.3 4.9 4.6   CHLORIDE mmol/L 101 100 103 107 108* 106 106   BUN mg/dL 17 14 16 16 15 16 16   CREATININE mg/dL 0.84 0.78 0.85 0.89 1.05* 1.01* 0.91   MAGNESIUM mg/dL  --   --   --  2.8* 2.6 2.8* 3.3*         Results from last 7 days   Lab Units 06/01/20 0415 05/31/20  0330 05/30/20  0338   WBC 10*3/mm3 14.52* 16.20* 17.36*   HEMOGLOBIN g/dL 11.5* 11.1* 12.2   HEMATOCRIT % 38.8 37.3 40.8   PLATELETS 10*3/mm3 139* 125* 123*       Lab Results   Component Value Date    HGBA1C 6.20 (H) 05/26/2020       Lab Results   Component Value Date    CHOL 111 05/29/2020    TRIG 69 05/29/2020    HDL 28 (L) 05/29/2020    LDL 69 05/29/2020              Active Hospital Problems    Diagnosis POA   • **Aortic stenosis with bicuspid valve status post mechanical AVR [Q23.0, Q23.1] Not Applicable     Priority: High     · Transesophageal echocardiogram (06/2016):  Bicuspid aortic valve present with fusion of the left and non-coronary cusp.  Moderate aortic stenosis with a mean gradient of 26 mmHg.  · Echo (8/25/2017):  LVEF 65%.  Moderate aortic stenosis (mean gradient 28 mmHg)  · Echo (1/20/2020): LVEF 30%.  Borderline LVH.  Severe aortic stenosis with mean gradient 49 mmHg.  · Left/right heart cath (5/5/2020): Moderate severe pulmonary hypertension.  Normal coronary arteries.  · Mechanical AVR (5/29/2020): 21 mm St Doug HP mechanical valve     • Diastolic heart failure (CMS/[facility]) [I50.30] Yes     Priority: High   • Type 2 diabetes mellitus without complication,  without long-term current use of insulin (CMS/[facility]) [E11.9] Yes     Priority: High     ·  Diet controlled.      • Essential hypertension [I10] Yes     Priority: Medium   • Hyperlipidemia [E78.5] Yes     Priority: Medium   • Secondary pulmonary arterial hypertension (CMS/[facility]) [I27.21] Yes     Priority: Medium     · Right heart catheterization (5/5/2020): RVSP 72 mmHg     • Sleep apnea with use of continuous positive airway pressure (CPAP) [G47.30] Yes     Priority: Low   • Class 3 severe obesity in adult (CMS/[facility]) [E66.01] Yes     Priority: Low              Hypertension  · Controlled, tolerating lisinopril metoprolol     Severe aortic stenosis  · s/p mechanical AVR 5/29/2020, Saint Doug valve  · Start Coumadin with INR managed by pharmacy.  Goal 2.5-3.5.     Moderate to severe Pulmonary HTN:  ·  re-evaluate after recovery from AVR, already has improvement in PA pressures compared with preop  · Likely multifactorial with elevated left-sided pressures from aortic stenosis as well as PEPE    Chronic diastolic heart failure  · Give Lasix 40 mg IV x1 today        Maria Del Carmen Durbin, APRN  6/1/2020

## 2020-06-01 NOTE — PROGRESS NOTES
CTS Progress Note      POD 3 s/p mechanical aortic valve replacement       LOS: 3 days   Patient Care Team:  Kreis, Samuel Duane, MD as PCP - General (Family Medicine)  Lewis Khan MD as Cardiologist (Cardiology)    Subjective  Awake, alert, up in chair  Slept fairly well  Pain under good control  Ambulating some with physical therapy    CC:     Objective    Vital Signs  Temp:  [97.8 °F (36.6 °C)-98.2 °F (36.8 °C)] 98.2 °F (36.8 °C)  Heart Rate:  [] 84  Resp:  [16-28] 22  BP: (106-140)/(60-77) 121/61    Physical Exam:   General Appearance:    Lungs: clear to auscultation, respirations regular, respirations even and respirations unlabored   Heart: regular rhythm & normal rate, normal S1, S2, no murmur, no gallop, no rub and no click   Skin: Warm, dry, sternum stable, incision c/d/i   Abdomen: Soft, nontender, bowel sounds present throughout.  Results   Results from last 7 days   Lab Units 06/01/20  0415   WBC 10*3/mm3 14.52*   HEMOGLOBIN g/dL 11.5*   HEMATOCRIT % 38.8   PLATELETS 10*3/mm3 139*     Results from last 7 days   Lab Units 06/01/20  0415   SODIUM mmol/L 137   POTASSIUM mmol/L 4.1   CHLORIDE mmol/L 101   CO2 mmol/L 26.0   BUN mg/dL 17   CREATININE mg/dL 0.84   GLUCOSE mg/dL 118*   CALCIUM mg/dL 8.6           Imaging Results (Last 24 Hours)     Procedure Component Value Units Date/Time    XR Chest 1 View [[id number]] Collected:  05/31/20 0811     Updated:  05/31/20 1411    Narrative:          EXAMINATION: XR CHEST 1 VW - 05/31/2020     INDICATION: Post-op heart surgery.     Z74.09-Other reduced mobility; I35.9-Nonrheumatic aortic valve disorder,  unspecified; Q23.0-Congenital stenosis of aortic valve; Q23.1-Congenital  insufficiency of aortic valve.      COMPARISON: Chest x-ray dated 05/30/2020     FINDINGS: Support hardware unchanged and in satisfactory positioning.  Cardiac size enlarged. Bibasilar opacifications are minimal and similar  to prior without further development. No pneumothorax or  pleural  effusion.           Impression:       No significant interval change.     DICTATED:   05/31/2020  EDITED/ls :   05/31/2020      This report was finalized on 5/31/2020 2:08 PM by Dr. William Méndez.             Assessment      Aortic stenosis with bicuspid valve status post mechanical AVR    Class 3 severe obesity in adult (CMS/[facility])    Type 2 diabetes mellitus without complication, without long-term current use of insulin (CMS/[facility])    Hypertension    Hyperlipidemia    Sleep apnea with use of continuous positive airway pressure (CPAP)    Secondary pulmonary arterial hypertension (CMS/HCC)    Diastolic heart failure (CMS/[facility])    Aortic valve disorder  Hemodynamically stable        Plan   Still needs a little strengthening with physical therapy  Started on Coumadin yesterday will order daily PT and INR hours  Home when a little stronger and therapeutic on Coumadin.  NICK Costello  06/01/20  07:30     As outlined above.  Stable postoperative course to this point.  Tentatively plan discharge on Wednesday, 6/3/2020 if off oxygen and ambulating without difficulty. I have reviewed, verified, and confirmed the above history and current status.  I have examined the patient and confirmed the above physical findings.Above plan and treatment regimen discussed in detail with patient.  Options of treatment, attendant risks vs benefits, and my recommendations were discussed and all questions answered.    Braden Gaines MD  CTSurgery  06/01/20   13:18

## 2020-06-02 LAB
ANION GAP SERPL CALCULATED.3IONS-SCNC: 12 MMOL/L (ref 5–15)
BUN BLD-MCNC: 28 MG/DL (ref 6–20)
BUN/CREAT SERPL: 35 (ref 7–25)
CALCIUM SPEC-SCNC: 8.9 MG/DL (ref 8.6–10.5)
CHLORIDE SERPL-SCNC: 97 MMOL/L (ref 98–107)
CO2 SERPL-SCNC: 26 MMOL/L (ref 22–29)
CREAT BLD-MCNC: 0.8 MG/DL (ref 0.57–1)
GFR SERPL CREATININE-BSD FRML MDRD: 76 ML/MIN/1.73
GLUCOSE BLD-MCNC: 102 MG/DL (ref 65–99)
GLUCOSE BLDC GLUCOMTR-MCNC: 117 MG/DL (ref 70–130)
GLUCOSE BLDC GLUCOMTR-MCNC: 140 MG/DL (ref 70–130)
GLUCOSE BLDC GLUCOMTR-MCNC: 99 MG/DL (ref 70–130)
INR PPP: 1.24 (ref 0.85–1.16)
POTASSIUM BLD-SCNC: 4 MMOL/L (ref 3.5–5.2)
PROTHROMBIN TIME: 15.3 SECONDS (ref 11.5–14)
SODIUM BLD-SCNC: 135 MMOL/L (ref 136–145)

## 2020-06-02 PROCEDURE — 99231 SBSQ HOSP IP/OBS SF/LOW 25: CPT | Performed by: NURSE PRACTITIONER

## 2020-06-02 PROCEDURE — 80048 BASIC METABOLIC PNL TOTAL CA: CPT | Performed by: PHYSICIAN ASSISTANT

## 2020-06-02 PROCEDURE — 85610 PROTHROMBIN TIME: CPT | Performed by: PHYSICIAN ASSISTANT

## 2020-06-02 PROCEDURE — 94799 UNLISTED PULMONARY SVC/PX: CPT

## 2020-06-02 PROCEDURE — 94660 CPAP INITIATION&MGMT: CPT

## 2020-06-02 PROCEDURE — 82962 GLUCOSE BLOOD TEST: CPT

## 2020-06-02 PROCEDURE — 99024 POSTOP FOLLOW-UP VISIT: CPT | Performed by: THORACIC SURGERY (CARDIOTHORACIC VASCULAR SURGERY)

## 2020-06-02 RX ORDER — DOCUSATE SODIUM 100 MG/1
100 CAPSULE, LIQUID FILLED ORAL 2 TIMES DAILY PRN
Status: DISCONTINUED | OUTPATIENT
Start: 2020-06-02 | End: 2020-06-03 | Stop reason: HOSPADM

## 2020-06-02 RX ORDER — AMOXICILLIN 250 MG
2 CAPSULE ORAL NIGHTLY PRN
Status: DISCONTINUED | OUTPATIENT
Start: 2020-06-02 | End: 2020-06-03 | Stop reason: HOSPADM

## 2020-06-02 RX ORDER — WARFARIN SODIUM 7.5 MG/1
7.5 TABLET ORAL
Status: DISCONTINUED | OUTPATIENT
Start: 2020-06-02 | End: 2020-06-03 | Stop reason: HOSPADM

## 2020-06-02 RX ADMIN — ASPIRIN 325 MG: 325 TABLET, COATED ORAL at 08:45

## 2020-06-02 RX ADMIN — WARFARIN SODIUM 7.5 MG: 7.5 TABLET ORAL at 17:35

## 2020-06-02 RX ADMIN — METOPROLOL TARTRATE 25 MG: 25 TABLET, FILM COATED ORAL at 20:49

## 2020-06-02 RX ADMIN — DOCUSATE SODIUM 100 MG: 100 CAPSULE, LIQUID FILLED ORAL at 09:04

## 2020-06-02 RX ADMIN — LISINOPRIL 10 MG: 10 TABLET ORAL at 08:45

## 2020-06-02 RX ADMIN — METOPROLOL TARTRATE 25 MG: 25 TABLET, FILM COATED ORAL at 08:45

## 2020-06-02 RX ADMIN — POLYETHYLENE GLYCOL 3350 17 G: 17 POWDER, FOR SOLUTION ORAL at 09:04

## 2020-06-02 RX ADMIN — ATORVASTATIN CALCIUM 40 MG: 40 TABLET, FILM COATED ORAL at 20:49

## 2020-06-02 NOTE — PLAN OF CARE
Problem: Patient Care Overview  Goal: Plan of Care Review  Outcome: Ongoing (interventions implemented as appropriate)  Flowsheets (Taken 6/2/2020 1613)  Progress: improving  Plan of Care Reviewed With: patient  Outcome Summary: VSS. Pt up to chair all day. Pt ambulated in the hallway. Pt on RA all day. Probably going home tomorrow. INR 1.24. Trying to get Warfarin close to therapeutic level before D/C. Will continue to monitor.

## 2020-06-02 NOTE — PROGRESS NOTES
CTS Progress Note      POD 4 s/p mechanical aortic valve replaced      Subjective  Up in chair.  Conversant.  No complaint.  Denies chest pain shortness of breath.  States activity levels slowly improving      Objective    Physical Exam:   Vital Signs   Temp:  [96.8 °F (36 °C)-98.3 °F (36.8 °C)] 97.6 °F (36.4 °C)  Heart Rate:  [70-89] 80  Resp:  [14-18] 16  BP: ()/(45-52) 93/49   GEN: NAD   CV: Regular rate and rhythm    RESP: Clear to auscultation bilaterally   ABD: Soft nontender positive bowel sounds    EXT: Warm to the touch no significant peripheral edema  Incision: Sternum stable, clean dry and intact     Results     Results from last 7 days   Lab Units 06/01/20  0415   WBC 10*3/mm3 14.52*   HEMOGLOBIN g/dL 11.5*   HEMATOCRIT % 38.8   PLATELETS 10*3/mm3 139*     Results from last 7 days   Lab Units 06/02/20  0255   SODIUM mmol/L 135*   POTASSIUM mmol/L 4.0   CHLORIDE mmol/L 97*   CO2 mmol/L 26.0   BUN mg/dL 28*   CREATININE mg/dL 0.80   GLUCOSE mg/dL 102*   CALCIUM mg/dL 8.9     Results from last 7 days   Lab Units 06/02/20  0251  05/29/20  1144   INR  1.24*   < > 1.45*   APTT seconds  --   --  29.8    < > = values in this interval not displayed.           Assessment/Plan   Postop day 4 status post mechanical AVR    Aortic stenosis with bicuspid valve status post mechanical AVR    Class 3 severe obesity in adult (CMS/[facility])    Type 2 diabetes mellitus without complication, without long-term current use of insulin (CMS/[facility])    Essential hypertension    Hyperlipidemia    Sleep apnea with use of continuous positive airway pressure (CPAP)    Secondary pulmonary arterial hypertension (CMS/HCC)    Diastolic heart failure (CMS/HCC)        Plan   Continue progressing postoperative rehabilitation with aggressive pulmonary toilet and increasing activity levels.  Awaiting therapeutic INR  Bowel management protocol for constipation  Home soon once INR close to therapeutic    Xavi Oliveros,  PA  06/02/20  08:57    Stable postoperative course.  Patient is anxious to be discharged.  She is currently on room air ambulating without difficulty.  Probably home in a.m. I have reviewed, verified, and confirmed the above history and current status.  I have examined the patient and confirmed the above physical findings.Above plan and treatment regimen discussed in detail with patient.  Options of treatment, attendant risks vs benefits, and my recommendations were discussed and all questions answered.    Braden Gaines MD  CTSurgery  06/02/20   10:45

## 2020-06-02 NOTE — PROGRESS NOTES
[facility] at [facility]  Cardiology Progress Note      Chief Complaint/Reason for service:    · Severe aortic stenosis/bicuspid aortic valve/status post AVR  · Hypertension  · Pulmonary hypertension  · Chronic diastolic heart failure         Patient is sitting up in the chair eating breakfast.  Denies chest pain or shortness of breath.  O2 saturations greater than 90% on room air.  She reports being a longtime 2 pack-a-day smoker but quit in January.    Past medical, surgical, social and family history reviewed in the patient's electronic medical record.         Vital Sign Min/Max for last 24 hours  Temp  Min: 96.8 °F (36 °C)  Max: 98.3 °F (36.8 °C)   BP  Min: 94/51  Max: 110/47   Pulse  Min: 70  Max: 89   Resp  Min: 14  Max: 18   SpO2  Min: 91 %  Max: 100 %   Flow (L/min)  Min: 0.5  Max: 2      Intake/Output Summary (Last 24 hours) at 6/2/2020 0802  Last data filed at 6/2/2020 0540  Gross per 24 hour   Intake 860 ml   Output --   Net 860 ml           Physical Exam   Constitutional: She is oriented to person, place, and time. She appears well-developed and well-nourished.   HENT:   Head: Normocephalic.   Neck: No JVD present. Carotid bruit is not present.   Cardiovascular: Normal rate, regular rhythm, normal heart sounds and intact distal pulses. Exam reveals no gallop and no friction rub.   No murmur heard.  Pulmonary/Chest: Effort normal. She has decreased breath sounds. She has rhonchi.   Abdominal: Soft.   Musculoskeletal: She exhibits no edema.   Neurological: She is alert and oriented to person, place, and time.   Skin: Skin is warm and dry. No cyanosis. Nails show no clubbing.   Psychiatric: She has a normal mood and affect. Her behavior is normal.       Tele: Normal sinus rhythm     Results Review (reviewed the patient's recent labs in the electronic medical record):      EKG (6/1/2020): Normal sinus rhythm  ST/T wave abnormality inferior lateral leads     CXR (5/31/2020): No  interval change.  Cardiac size is enlarged.  Bibasilar opacifications are minimal and similar to prior chest x-ray.  No pneumothorax or pleural effusion        Intraoperative ROSANNA (5/29/2020): No perivalvular leak of mechanical aortic valve.  Mean gradient 14 mmHg.  Grade 3 diastolic dysfunction     Results from last 7 days   Lab Units 06/02/20  0255 06/01/20  0415 05/31/20  0330 05/30/20  0546 05/29/20  2351 05/29/20 2006 05/29/20  1547   SODIUM mmol/L 135* 137 137 139 142 142 140   POTASSIUM mmol/L 4.0 4.1 4.4 4.5 4.6 4.3 4.9   CHLORIDE mmol/L 97* 101 100 103 107 108* 106   BUN mg/dL 28* 17 14 16 16 15 16   CREATININE mg/dL 0.80 0.84 0.78 0.85 0.89 1.05* 1.01*   MAGNESIUM mg/dL  --   --   --   --  2.8* 2.6 2.8*         Results from last 7 days   Lab Units 06/01/20 0415 05/31/20 0330 05/30/20  0338   WBC 10*3/mm3 14.52* 16.20* 17.36*   HEMOGLOBIN g/dL 11.5* 11.1* 12.2   HEMATOCRIT % 38.8 37.3 40.8   PLATELETS 10*3/mm3 139* 125* 123*       Lab Results   Component Value Date    HGBA1C 6.20 (H) 05/26/2020       Lab Results   Component Value Date    CHOL 111 05/29/2020    TRIG 69 05/29/2020    HDL 28 (L) 05/29/2020    LDL 69 05/29/2020              Active Hospital Problems    Diagnosis POA   • **Aortic stenosis with bicuspid valve status post mechanical AVR [Q23.0, Q23.1] Not Applicable     Priority: High     · Transesophageal echocardiogram (06/2016):  Bicuspid aortic valve present with fusion of the left and non-coronary cusp.  Moderate aortic stenosis with a mean gradient of 26 mmHg.  · Echo (8/25/2017):  LVEF 65%.  Moderate aortic stenosis (mean gradient 28 mmHg)  · Echo (1/20/2020): LVEF 30%.  Borderline LVH.  Severe aortic stenosis with mean gradient 49 mmHg.  · Left/right heart cath (5/5/2020): Moderate severe pulmonary hypertension.  Normal coronary arteries.  · Mechanical AVR (5/29/2020): 21 mm St Doug HP mechanical valve     • Diastolic heart failure (CMS/HCC) [I50.30] Yes     Priority: High   • Type 2  diabetes mellitus without complication, without long-term current use of insulin (CMS/[facility]) [E11.9] Yes     Priority: High     ·  Diet controlled.      • Essential hypertension [I10] Yes     Priority: Medium   • Hyperlipidemia [E78.5] Yes     Priority: Medium   • Secondary pulmonary arterial hypertension (CMS/[facility]) [I27.21] Yes     Priority: Medium     · Right heart catheterization (5/5/2020): RVSP 72 mmHg     • Sleep apnea with use of continuous positive airway pressure (CPAP) [G47.30] Yes     Priority: Low   • Class 3 severe obesity in adult (CMS/[facility]) [E66.01] Yes     Priority: Low              Hypertension  · Mildly hypotensive but tolerating lisinopril metoprolol     Severe aortic stenosis  · s/p mechanical AVR 5/29/2020, Saint Doug valve  · Coumadin with INR managed by pharmacy.  Goal 2.5-3.5.  · Current INR 1.24  · Will need enrollment in [facility] anticoagulation clinic     Moderate to severe Pulmonary HTN:  ·  re-evaluate after recovery from AVR, already has improvement in PA pressures compared with preop  · Likely multifactorial with elevated left-sided pressures from aortic stenosis as well as PEPE     Chronic diastolic heart failure  · Received IV Lasix yesterday  · Currently appears compensated    Tobacco Abuse  · Congratulated on continued compliance with smoking cessation        Maria Del Carmen Durbin, APRYAEL  6/2/2020

## 2020-06-02 NOTE — PROGRESS NOTES
"Pharmacy Consult  -  Warfarin    Luisa Conteh is a  49 y.o. female   Height - 175.3 cm (69.02\")  Weight - (!) 140 kg (308 lb 10.3 oz)    Consulting Provider: - NAVA Barahona)  Indication: - Aortic Mechanical Valve Replacement (Saint Doug Valve)  Goal INR: - 2.5-3.5  Home Regimen:   - New Start    Bridge Therapy: No    Drug-Drug Interactions with current regimen:  Aspirin - may increase risk of bleeding    Warfarin Dosing During Admission:    Date  6/1 6/2          INR  1.21 1.24          Dose  5mg (7.5mg)               Education Provided: Patient is a new start on warfarin. Education provided on 6/1 verbally and in writing. Discussed effects of warfarin, importance of checking INR, drug-drug and drug-food interactions, and signs/symptoms of bleeding and clotting. Patient verbalized understanding through teach back. All pertinent questions were answered.      Discharge Follow up:   Following Provider - New Start to Warfarin   Follow up time range or appointment - 3-5 days after discharge    Labs:    Results from last 7 days   Lab Units 06/02/20  0251 06/01/20  0901 06/01/20  0415 05/31/20  0330 05/30/20  0341 05/30/20  0338 05/29/20  2351 05/29/20  1942 05/29/20  1547 05/29/20  1144  05/26/20  1310   INR  1.24* 1.21*  --   --  1.24*  --   --   --   --  1.45*  --  1.09   APTT seconds  --   --   --   --   --   --   --   --   --  29.8  --  30.3   HEMOGLOBIN g/dL  --   --  11.5* 11.1*  --  12.2 12.2 12.6 12.7 12.6  --  14.1   HEMOGLOBIN, POC   --   --   --   --   --   --   --   --   --   --    < >  --    HEMATOCRIT %  --   --  38.8 37.3  --  40.8 40.4 41.2 42.7 41.9  --  46.2   HEMATOCRIT POC   --   --   --   --   --   --   --   --   --   --    < >  --     < > = values in this interval not displayed.     Results from last 7 days   Lab Units 06/02/20  0255 06/01/20  0415 05/31/20  0330  05/26/20  1310   SODIUM mmol/L 135* 137 137   < > 137   POTASSIUM mmol/L 4.0 4.1 4.4   < > 4.2   CHLORIDE mmol/L 97* 101 100   < > " 100   CO2 mmol/L 26.0 26.0 26.0   < > 24.0   BUN mg/dL 28* 17 14   < > 16   CREATININE mg/dL 0.80 0.84 0.78   < > 0.94   CALCIUM mg/dL 8.9 8.6 8.7   < > 8.9   BILIRUBIN mg/dL  --   --   --   --  0.3   ALK PHOS U/L  --   --   --   --  91   ALT (SGPT) U/L  --   --   --   --  11   AST (SGOT) U/L  --   --   --   --  15   GLUCOSE mg/dL 102* 118* 139*   < > 100*    < > = values in this interval not displayed.       Current dietary intake: % of documented meals  Dietary Orders (From admission, onward)       Start     Ordered    05/30/20 1000  Diet Regular; Cardiac, Consistent Carbohydrate  Diet Effective 1000     Question Answer Comment   Diet Texture / Consistency Regular    Common Modifiers Cardiac    Common Modifiers Consistent Carbohydrate        05/30/20 0837                    Assessment/Plan:   Pharmacy to dose warfarin for Aortic Mechanical Valve Replacement. Goal INR 2.5-3.5.   Patient's INR is subtherapeutic currently at 1.24.  With INR still subtherapeutic will give warfarin 7.5mg tonight.   H/H - 11.5/38.8, Diet: % documented meals, no major drug-drug interactions  Will monitor signs/symptoms of bleeding, dietary intake, and drug-drug interactions.   Will follow daily PT/INR and adjust dose accordingly    Pharmacy will continue to follow.     Thank you  Roberto Ortega RPH  6/2/2020  11:19

## 2020-06-03 ENCOUNTER — TELEPHONE (OUTPATIENT)
Dept: PHARMACY | Facility: HOSPITAL | Age: 50
End: 2020-06-03

## 2020-06-03 VITALS
SYSTOLIC BLOOD PRESSURE: 109 MMHG | BODY MASS INDEX: 43.4 KG/M2 | HEIGHT: 69 IN | OXYGEN SATURATION: 93 % | DIASTOLIC BLOOD PRESSURE: 73 MMHG | TEMPERATURE: 97.9 F | RESPIRATION RATE: 16 BRPM | WEIGHT: 293 LBS | HEART RATE: 79 BPM

## 2020-06-03 PROBLEM — Z74.09 IMPAIRED FUNCTIONAL MOBILITY, BALANCE, GAIT, AND ENDURANCE: Status: ACTIVE | Noted: 2020-06-03

## 2020-06-03 PROBLEM — Z95.2 HX OF AORTIC VALVE REPLACEMENT, MECHANICAL: Status: ACTIVE | Noted: 2020-06-03

## 2020-06-03 LAB
GLUCOSE BLDC GLUCOMTR-MCNC: 120 MG/DL (ref 70–130)
GLUCOSE BLDC GLUCOMTR-MCNC: 90 MG/DL (ref 70–130)
INR PPP: 1.25 (ref 0.85–1.16)
PROTHROMBIN TIME: 15.4 SECONDS (ref 11.5–14)

## 2020-06-03 PROCEDURE — 99024 POSTOP FOLLOW-UP VISIT: CPT | Performed by: PHYSICIAN ASSISTANT

## 2020-06-03 PROCEDURE — 99024 POSTOP FOLLOW-UP VISIT: CPT | Performed by: THORACIC SURGERY (CARDIOTHORACIC VASCULAR SURGERY)

## 2020-06-03 PROCEDURE — 25010000002 ENOXAPARIN PER 10 MG

## 2020-06-03 PROCEDURE — 99231 SBSQ HOSP IP/OBS SF/LOW 25: CPT | Performed by: NURSE PRACTITIONER

## 2020-06-03 PROCEDURE — 85610 PROTHROMBIN TIME: CPT | Performed by: PHYSICIAN ASSISTANT

## 2020-06-03 PROCEDURE — 97530 THERAPEUTIC ACTIVITIES: CPT

## 2020-06-03 PROCEDURE — 82962 GLUCOSE BLOOD TEST: CPT

## 2020-06-03 RX ORDER — WARFARIN SODIUM 7.5 MG/1
TABLET ORAL
Qty: 30 TABLET | Refills: 0 | Status: SHIPPED | OUTPATIENT
Start: 2020-06-03 | End: 2020-06-05 | Stop reason: ALTCHOICE

## 2020-06-03 RX ORDER — ATORVASTATIN CALCIUM 40 MG/1
40 TABLET, FILM COATED ORAL NIGHTLY
Qty: 30 TABLET | Refills: 3 | Status: SHIPPED | OUTPATIENT
Start: 2020-06-03

## 2020-06-03 RX ORDER — HYDROCODONE BITARTRATE AND ACETAMINOPHEN 7.5; 325 MG/1; MG/1
1 TABLET ORAL EVERY 6 HOURS PRN
Qty: 30 TABLET | Refills: 0 | Status: SHIPPED | OUTPATIENT
Start: 2020-06-03

## 2020-06-03 RX ADMIN — POLYETHYLENE GLYCOL 3350 17 G: 17 POWDER, FOR SOLUTION ORAL at 08:01

## 2020-06-03 RX ADMIN — ASPIRIN 325 MG: 325 TABLET, COATED ORAL at 08:01

## 2020-06-03 RX ADMIN — LISINOPRIL 10 MG: 10 TABLET ORAL at 08:01

## 2020-06-03 RX ADMIN — METOPROLOL TARTRATE 25 MG: 25 TABLET, FILM COATED ORAL at 08:00

## 2020-06-03 RX ADMIN — ENOXAPARIN SODIUM 140 MG: 150 INJECTION SUBCUTANEOUS at 15:37

## 2020-06-03 NOTE — TELEPHONE ENCOUNTER
Ms. Conteh is a new referral to the Anticoagulation Clinic from Dr. Kumar Ramirez for Aortic Mechanical Valve Replacement (St Doug Valve). Her goal INR is 2.5-3.5 and she is currently still admitted to [facility]. Due to sub therapeutic INR, Ms. Conteh is currently on Lovenox inpatient.     Will call to discuss with [facility] tomorrow as patient lives in [place], [place] and may be more convenient for her there and she follows with [facility].    If they are unable to manage there, may need to look into having Dr. Lewis Khan sign CCA and referral.

## 2020-06-03 NOTE — PROGRESS NOTES
"Pharmacy Consult  -  Warfarin    Luisa Conteh is a  49 y.o. female   Height - 175.3 cm (69.02\")  Weight - (!) 138 kg (303 lb 9.6 oz)    Consulting Provider: - NAVA (Elia)  Indication: - Aortic Mechanical Valve Replacement (Saint Doug Valve)  Goal INR: - 2.5-3.5  Home Regimen:   - New Start    Bridge Therapy: Therapeutic Lovenox (1mg/kg) Q12H    Drug-Drug Interactions with current regimen:  Aspirin - may increase risk of bleeding  Lovenox - may increase risk of bleeding    Warfarin Dosing During Admission:    Date  6/1 6/2 6/3         INR  1.21 1.24 1.25         Dose  5mg 7.5mg (7.5mg)              Education Provided: Patient is a new start on warfarin. Education provided on 6/1 verbally and in writing. Discussed effects of warfarin, importance of checking INR, drug-drug and drug-food interactions, and signs/symptoms of bleeding and clotting. Patient verbalized understanding through teach back. All pertinent questions were answered.      Discharge Follow up:   Following Provider - [facility]   Follow up time range or appointment - 3-5 days after discharge    Labs:    Results from last 7 days   Lab Units 06/03/20  0305 06/02/20  0251 06/01/20  0901 06/01/20  0415 05/31/20  0330 05/30/20  0341 05/30/20  0338 05/29/20  2351 05/29/20  1942 05/29/20  1547 05/29/20  1144   INR  1.25* 1.24* 1.21*  --   --  1.24*  --   --   --   --  1.45*   APTT seconds  --   --   --   --   --   --   --   --   --   --  29.8   HEMOGLOBIN g/dL  --   --   --  11.5* 11.1*  --  12.2 12.2 12.6 12.7 12.6   HEMATOCRIT %  --   --   --  38.8 37.3  --  40.8 40.4 41.2 42.7 41.9     Results from last 7 days   Lab Units 06/02/20  0255 06/01/20  0415 05/31/20  0330   SODIUM mmol/L 135* 137 137   POTASSIUM mmol/L 4.0 4.1 4.4   CHLORIDE mmol/L 97* 101 100   CO2 mmol/L 26.0 26.0 26.0   BUN mg/dL 28* 17 14   CREATININE mg/dL 0.80 0.84 0.78   CALCIUM mg/dL 8.9 8.6 8.7   GLUCOSE mg/dL 102* 118* 139*       Current dietary intake: % of " documented meals  Dietary Orders (From admission, onward)       Start     Ordered    05/30/20 1000  Diet Regular; Cardiac, Consistent Carbohydrate  Diet Effective 1000     Question Answer Comment   Diet Texture / Consistency Regular    Common Modifiers Cardiac    Common Modifiers Consistent Carbohydrate        05/30/20 0856                    Assessment/Plan:   Pharmacy to dose warfarin for Aortic Mechanical Valve Replacement. Goal INR 2.5-3.5.   Patient's INR is subtherapeutic currently at 1.25.  Will continue current dose of warfarin 7.5mg tonight.   Starting Lovenox Bridge (1mg/kg) until patient has therapeutic INR.  [facility] clinic referral has been sent recommend patient follows up in the next 3-5 days after discharge.    H/H - 11.5/38.8, Diet: % documented meals, no major drug-drug interactions  Will monitor signs/symptoms of bleeding, dietary intake, and drug-drug interactions.   Will follow daily PT/INR and adjust dose accordingly    Pharmacy will continue to follow.     Thank you  Roberto Ortega RPH  6/3/2020  12:52

## 2020-06-03 NOTE — PLAN OF CARE
Problem: Patient Care Overview  Goal: Plan of Care Review  Outcome: Ongoing (interventions implemented as appropriate)  Flowsheets (Taken 6/3/2020 3340)  Plan of Care Reviewed With: patient  Note:   VSS. Pt weaing CPAP for most of shift. Has gone back and forth from bed to chair. Pt ambulated for 3rd time in palafox this pm. Pt very anxious to go home later this morning. SR. AO. Pleasant, but flat affect at times. Will continue to monitor.

## 2020-06-03 NOTE — PROGRESS NOTES
Case Management Discharge Note      Final Note: Mrs. Conteh is being discharged home today. She will need to do Lovenox injections until her INR is therapeutic. I called her hometown pharmacy and they do not have Lovenox in stock. Per [place] at [facility], a 5 day supply will cost Mrs. Conteh $294.10. I discussed this with her at the bedside and she is agreeable. [facility] will deliver this today and complete injection teaching. Mrs. Conteh has been instructed to go to her PCP's office on Monday June 8th to have her INR checked. Her PCP will manage her coumadin dosing. She verbalizes understanding. Her sister will transport her home by car. She denies having any additional discharge needs.         Destination      No service has been selected for the patient.      Durable Medical Equipment      No service has been selected for the patient.      Dialysis/Infusion - Selection Complete      Service Provider Request Status Selected Services Address Phone Number Fax Number    [facility] Selected Infusion and IV Therapy [address], [place] [id number] [phone]      Home Medical Care      No service has been selected for the patient.      Therapy      No service has been selected for the patient.      Community Resources      No service has been selected for the patient.             Final Discharge Disposition Code: 01 - home or self-care

## 2020-06-03 NOTE — PROGRESS NOTES
CTS Progress Note      POD 5 s/p mechanical aortic valve replacement      Subjective  Sitting up in chair.  Did not sleep well.  Anxious for discharge home      Objective    Physical Exam:   Vital Signs   Temp:  [97.6 °F (36.4 °C)-97.9 °F (36.6 °C)] 97.9 °F (36.6 °C)  Heart Rate:  [64-86] 65  Resp:  [16-18] 16  BP: (105-127)/(50-73) 109/73   GEN: NAD   CV: Regular rate and rhythm no murmur gallop    RESP: Unlabored, clear to auscultation room air   EXT: Warm to the touch no significant peripheral edema   Incision: Sternum stable clean dry and intact     Results     Results from last 7 days   Lab Units 06/01/20  0415   WBC 10*3/mm3 14.52*   HEMOGLOBIN g/dL 11.5*   HEMATOCRIT % 38.8   PLATELETS 10*3/mm3 139*     Results from last 7 days   Lab Units 06/02/20  0255   SODIUM mmol/L 135*   POTASSIUM mmol/L 4.0   CHLORIDE mmol/L 97*   CO2 mmol/L 26.0   BUN mg/dL 28*   CREATININE mg/dL 0.80   GLUCOSE mg/dL 102*   CALCIUM mg/dL 8.9     Results from last 7 days   Lab Units 06/03/20  0305  05/29/20  1144   INR  1.25*   < > 1.45*   APTT seconds  --   --  29.8    < > = values in this interval not displayed.         Assessment/Plan       Aortic stenosis with bicuspid valve status post mechanical AVR    Class 3 severe obesity in adult (CMS/[facility])    Type 2 diabetes mellitus without complication, without long-term current use of insulin (CMS/[facility])    Essential hypertension    Hyperlipidemia    Sleep apnea with use of continuous positive airway pressure (CPAP)    Secondary pulmonary arterial hypertension (CMS/[facility])    Diastolic heart failure (CMS/[facility])        Plan   Continue monitoring INR with daily checks.  Pharmacy managing, slow response  Home if progress continues and INR approaching therapeutic levels    NICK Aguilera  06/03/20  08:11    As noted above.  Home soon.  Patient totally stable.  Hopefully can arrange for NR/Coumadin management as an outpatient. I have reviewed, verified, and confirmed the above history and  current status.  I have examined the patient and confirmed the above physical findings.Above plan and treatment regimen discussed in detail with patient.  Options of treatment, attendant risks vs benefits, and my recommendations were discussed and all questions answered.    Braden Gaines MD  [facility]gery  06/03/20   11:01

## 2020-06-03 NOTE — THERAPY TREATMENT NOTE
Patient Name: Luisa Conteh  : 1970    MRN: [id number]                              Today's Date: 6/3/2020       Admit Date: 2020    Visit Dx:     ICD-10-CM ICD-9-CM   1. Aortic stenosis with bicuspid valve status post mechanical AVR Q23.0 746.3    Q23.1 746.4   2. Aortic valve disorder I35.9 424.1   3. Aortic stenosis with bicuspid valve Q23.0 746.3    Q23.1 746.4   4. Impaired functional mobility, balance, gait, and endurance Z74.09 V49.89   5. H/O mechanical aortic valve replacement Z95.2 V43.3     Patient Active Problem List   Diagnosis   • Aortic stenosis with bicuspid valve status post mechanical AVR   • Class 3 severe obesity in adult (CMS/HCC)   • Tobacco abuse   • Type 2 diabetes mellitus without complication, without long-term current use of insulin (CMS/HCC)   • Palpitations   • Bilateral carotid bruits   • Essential hypertension   • Hyperlipidemia   • Sleep apnea with use of continuous positive airway pressure (CPAP)   • Secondary pulmonary arterial hypertension (CMS/HCC)   • Diastolic heart failure (CMS/HCC)   • Impaired functional mobility, balance, gait, and endurance   • Hx of aortic valve replacement, mechanical     Past Medical History:   Diagnosis Date   • Aortic valve stenosis    • CHF (congestive heart failure) (CMS/HCC)    • Heart murmur    • Hyperlipidemia    • Hypertension    • Low back pain    • Neuropathy    • Obesity    • Pulmonary hypertension (CMS/HCC)    • Sleep apnea with use of continuous positive airway pressure (CPAP)    • Type 2 diabetes mellitus (CMS/HCC)      diet controlled.    • Wears glasses      Past Surgical History:   Procedure Laterality Date   • AORTIC VALVE REPAIR/REPLACEMENT N/A 2020    Procedure: Mediansternotomy, AORTIC VALVE REPLACEMENT WITH Transesophageal echocardiogram;  Surgeon: Kumar Ramirez MD;  Location: [facility];  Service: Cardiothoracic;  Laterality: N/A;   • APPENDECTOMY     • CARDIAC CATHETERIZATION N/A 2020    Procedure:  Right and Left Heart Cath;  Surgeon: Lewis Khan MD;  Location:  [facility];  Service: Cardiology;  Laterality: N/A;   • CHOLECYSTECTOMY     • OTHER SURGICAL HISTORY      Lap-Band surgery in June 2015   • TONSILLECTOMY       General Information     Row Name 06/03/20 1446          PT Evaluation Time/Intention    Document Type  therapy note (daily note)  -EJ     Mode of Treatment  physical therapy  -EJ     Row Name 06/03/20 1446          General Information    Patient Profile Reviewed?  yes  -EJ     Existing Precautions/Restrictions  cardiac;sternal  -EJ     Row Name 06/03/20 1446          Safety Issues, Functional Mobility    Safety Issues Affecting Function (Mobility)  safety precautions follow-through/compliance  -EJ     Impairments Affecting Function (Mobility)  endurance/activity tolerance  -EJ       User Key  (r) = Recorded By, (t) = Taken By, (c) = Cosigned By    Initials Name Provider Type    EJ Sandie Bolton PT Physical Therapist        Mobility     Row Name 06/03/20 1448          Bed Mobility Assessment/Treatment    Comment (Bed Mobility)  Pt up in chair, returned to chair.  -EJ     Row Name 06/03/20 1448          Sit-Stand Transfer    Sit-Stand [place] (Transfers)  supervision;verbal cues vcs for hand placement to maintain sternal precautions  -EJ     Row Name 06/03/20 1448          Gait/Stairs Assessment/Training    Gait/Stairs Assessment/Training  gait/ambulation independence;gait/ambulation assistive device  -EJ     [place] Level (Gait)  stand by assist  -EJ     Distance in Feet (Gait)  300  -EJ     Pattern (Gait)  step-through  -EJ     Deviations/Abnormal Patterns (Gait)  gait speed decreased;stride length decreased  -EJ     Comment (Gait/Stairs)  Pt ambulates at slow pace, with PLB. Pt also performs 5 step ups with 1 hand rail to simulate stairs at home. Pt required small standing rest between 3rd, 4th and 5th step ups.   -EJ       User Key  (r) = Recorded By, (t)  = Taken By, (c) = Cosigned By    Initials Name Provider Type     Sandie Bolton PT Physical Therapist        Obj/Interventions     [facility] Name 06/03/20 1455          Therapeutic Exercise    Upper Extremity Range of Motion (Therapeutic Exercise)  elbow flexion/extension, bilateral;shoulder abduction/adduction, bilateral  -EJ     Lower Extremity (Therapeutic Exercise)  LAQ (long arc quad), bilateral  -EJ     Lower Extremity Range of Motion (Therapeutic Exercise)  ankle dorsiflexion/plantar flexion, bilateral  -     Exercise Type (Therapeutic Exercise)  AROM (active range of motion)  -EJ     Position (Therapeutic Exercise)  seated  -EJ     Sets/Reps (Therapeutic Exercise)  1/10  -EJ     Comment (Therapeutic Exercise)  PT discussed ther ex, demonstrated as needed, and pt performed teach back on some exercises. PT adjusted HEP as needed for pt.  -[facility] Name 06/03/20 1457          Sensory Assessment/Intervention    Sensory General Assessment  no sensation deficits identified  -       User Key  (r) = Recorded By, (t) = Taken By, (c) = Cosigned By    Initials Name Provider Type    Sandie Jones PT Physical Therapist        Goals/Plan    No documentation.       Clinical Impression     [facility] Name 06/03/20 1502          Pain Assessment    Additional Documentation  Pain Scale: Numbers Pre/Post-Treatment (Group)  -EJ     Row Name 06/03/20 1506          Pain Scale: Numbers Pre/Post-Treatment    Pain Scale: Numbers, Pretreatment  0/10 - no pain  -     Pain Scale: Numbers, Post-Treatment  0/10 - no pain  -     Pain Intervention(s)  Repositioned;Ambulation/increased activity  -[facility] Name 06/03/20 5345          Plan of Care Review    Plan of Care Reviewed With  patient  -     Progress  improving  -     Outcome Summary  PT ambulates 300 ft with CGA, progression to SBA. Pt performs sit to stand with supervision. Precautions were reviewed, Written HEP issued, stairs practiced.   -[facility] Name 06/03/20  1507          Positioning and Restraints    Pre-Treatment Position  sitting in chair/recliner  -EJ     Post Treatment Position  chair  -EJ     In Chair  sitting;call light within reach;encouraged to call for assist  -EJ       User Key  (r) = Recorded By, (t) = Taken By, (c) = Cosigned By    Initials Name Provider Type    Sandie Jones PT Physical Therapist        Outcome Measures     Row Name 06/03/20 1509          How much help from another person do you currently need...    Turning from your back to your side while in flat bed without using bedrails?  3  -EJ     Moving from lying on back to sitting on the side of a flat bed without bedrails?  3  -EJ     Moving to and from a bed to a chair (including a wheelchair)?  4  -EJ     Standing up from a chair using your arms (e.g., wheelchair, bedside chair)?  4  -EJ     Climbing 3-5 steps with a railing?  3  -EJ     To walk in hospital room?  4  -EJ     AM-PAC 6 Clicks Score (PT)  21  -EJ     Row Name 06/03/20 1509          Functional Assessment    Outcome Measure Options  AM-PAC 6 Clicks Basic Mobility (PT)  -EJ       User Key  (r) = Recorded By, (t) = Taken By, (c) = Cosigned By    Initials Name Provider Type    Sandie Jones PT Physical Therapist        Physical Therapy Education                 Title: PT OT SLP Therapies (Resolved)     Topic: Physical Therapy (Resolved)     Point: Mobility training (Resolved)     Description:   Instruct learner(s) on safety and technique for assisting patient out of bed, chair or wheelchair.  Instruct in the proper use of assistive devices, such as walker, crutches, cane or brace.              Patient Friendly Description:   It's important to get you on your feet again, but we need to do so in a way that is safe for you. Falling has serious consequences, and your personal safety is the most important thing of all.        When it's time to get out of bed, one of us or a family member will sit next to you on the bed to  give you support.     If your doctor or nurse tells you to use a walker, crutches, a cane, or a brace, be sure you use it every time you get out of bed, even if you think you don't need it.    Learning Progress Summary           Patient Acceptance, E, VU by  at 5/31/2020 0900                   Point: Home exercise program (Resolved)     Description:   Instruct learner(s) on appropriate technique for monitoring, assisting and/or progressing patient with therapeutic exercises and activities.              Learner Progress:   Not documented in this visit.          Point: Precautions (Resolved)     Description:   Instruct learner(s) on prescribed precautions during mobility and gait tasks              Learning Progress Summary           Patient Acceptance, E, VU,NR by  at 5/31/2020 1353    Comment:  sternal precautions    Acceptance, E, VU,NR by  at 5/30/2020 1139    Comment:  sternal precautions                               User Key     Initials Effective Dates Name Provider Type Discipline     07/17/19 -  Diane King, PT Physical Therapist PT     06/16/16 -  Courtney Yan RN Registered Nurse Nurse              PT Recommendation and Plan     Plan of Care Reviewed With: patient  Progress: improving  Outcome Summary: PT ambulates 300 ft with CGA, progression to SBA. Pt performs sit to stand with supervision. Precautions were reviewed, Written HEP issued, stairs practiced.      Time Calculation:   PT Charges     Row Name 06/03/20 1512             Time Calculation    Start Time  1417  -EJ      PT Received On  06/03/20  -      PT Goal Re-Cert Due Date  06/09/20  -         Time Calculation- PT    Total Timed Code Minutes- PT  23 minute(s)  -EJ         Timed Charges    32112 - PT Therapeutic Activity Minutes  23  -EJ        User Key  (r) = Recorded By, (t) = Taken By, (c) = Cosigned By    Initials Name Provider Type    EJ Sandie Bolton PT Physical Therapist        Therapy Charges for Today      Code Description Service Date Service Provider Modifiers Qty    80468892939  PT THERAPEUTIC ACT EA 15 MIN 6/3/2020 Sandie Bloton, PT GP 2          PT G-Codes  Outcome Measure Options: AM-PAC 6 Clicks Basic Mobility (PT)  AM-PAC 6 Clicks Score (PT): 21    Sandei Fernández, PT  6/3/2020

## 2020-06-03 NOTE — PLAN OF CARE
Problem: Patient Care Overview  Goal: Plan of Care Review  6/3/2020 1511 by Sandie Bolton PT  Outcome: Outcome(s) achieved  Flowsheets (Taken 6/3/2020 1507)  Progress: improving  Plan of Care Reviewed With: patient  Outcome Summary: PT ambulates 300 ft with CGA, progression to SBA. Pt performs sit to stand with supervision. Precautions were reviewed, Written HEP issued, stairs practiced.

## 2020-06-03 NOTE — PLAN OF CARE
Problem: Patient Care Overview  Goal: Plan of Care Review  6/3/2020 1510 by Sandie Bolton, PT  Flowsheets (Taken 6/3/2020 1507)  Progress: improving  Plan of Care Reviewed With: patient  Outcome Summary: PT ambulates 300 ft with CGA, progression to SBA. Pt performs sit to stand with supervision. Precautions were reviewed, Written HEP issued, stairs practiced.

## 2020-06-03 NOTE — PROGRESS NOTES
[facility]  Cardiology Progress Note      Chief Complaint/Reason for service:    · Severe aortic stenosis/bicuspid aortic valve/status post AVR  · Hypertension  · Pulmonary hypertension  · Chronic diastolic heart failure         Patient sitting up in the chair breathing easy on room air.  Has been ambulating without difficulty.  INR remains subtherapeutic at 1.25.  She denies chest pain or dyspnea.    Past medical, surgical, social and family history reviewed in the patient's electronic medical record.         Vital Sign Min/Max for last 24 hours  Temp  Min: 97.6 °F (36.4 °C)  Max: 97.9 °F (36.6 °C)   BP  Min: 105/61  Max: 127/61   Pulse  Min: 64  Max: 86   Resp  Min: 16  Max: 18   SpO2  Min: 93 %  Max: 98 %   No data recorded    No intake or output data in the 24 hours ending 06/03/20 1042        Physical Exam   Constitutional: She is oriented to person, place, and time. She appears well-developed and well-nourished.   HENT:   Head: Normocephalic.   Neck: No JVD present. Carotid bruit is not present.   Cardiovascular: Normal rate, regular rhythm, normal heart sounds and intact distal pulses. Exam reveals no gallop and no friction rub.   No murmur heard.  Pulmonary/Chest: Effort normal and breath sounds normal.   Abdominal: Soft.   Musculoskeletal: She exhibits no edema.   Neurological: She is alert and oriented to person, place, and time.   Skin: Skin is warm and dry. No cyanosis. Nails show no clubbing.   Psychiatric: She has a normal mood and affect. Her behavior is normal.     Tele: Normal sinus rhythm     Results Review (reviewed the patient's recent labs in the electronic medical record):      EKG (6/1/2020): Normal sinus rhythm  ST/T wave abnormality inferior lateral leads     CXR (5/31/2020): No interval change.  Cardiac size is enlarged.  Bibasilar opacifications are minimal and similar to prior chest x-ray.  No pneumothorax or pleural effusion        Intraoperative  ROSANNA (5/29/2020): No perivalvular leak of mechanical aortic valve.  Mean gradient 14 mmHg.  Grade 3 diastolic dysfunction        Results from last 7 days   Lab Units 06/02/20  0255 06/01/20 0415 05/31/20  0330 05/30/20  0546 05/29/20  2351 05/29/20 2006 05/29/20  1547   SODIUM mmol/L 135* 137 137 139 142 142 140   POTASSIUM mmol/L 4.0 4.1 4.4 4.5 4.6 4.3 4.9   CHLORIDE mmol/L 97* 101 100 103 107 108* 106   BUN mg/dL 28* 17 14 16 16 15 16   CREATININE mg/dL 0.80 0.84 0.78 0.85 0.89 1.05* 1.01*   MAGNESIUM mg/dL  --   --   --   --  2.8* 2.6 2.8*         Results from last 7 days   Lab Units 06/01/20 0415 05/31/20 0330 05/30/20  0338   WBC 10*3/mm3 14.52* 16.20* 17.36*   HEMOGLOBIN g/dL 11.5* 11.1* 12.2   HEMATOCRIT % 38.8 37.3 40.8   PLATELETS 10*3/mm3 139* 125* 123*       Lab Results   Component Value Date    HGBA1C 6.20 (H) 05/26/2020       Lab Results   Component Value Date    CHOL 111 05/29/2020    TRIG 69 05/29/2020    HDL 28 (L) 05/29/2020    LDL 69 05/29/2020              Active Hospital Problems    Diagnosis POA   • **Aortic stenosis with bicuspid valve status post mechanical AVR [Q23.0, Q23.1] Not Applicable     Priority: High     · Transesophageal echocardiogram (06/2016):  Bicuspid aortic valve present with fusion of the left and non-coronary cusp.  Moderate aortic stenosis with a mean gradient of 26 mmHg.  · Echo (8/25/2017):  LVEF 65%.  Moderate aortic stenosis (mean gradient 28 mmHg)  · Echo (1/20/2020): LVEF 30%.  Borderline LVH.  Severe aortic stenosis with mean gradient 49 mmHg.  · Left/right heart cath (5/5/2020): Moderate severe pulmonary hypertension.  Normal coronary arteries.  · Mechanical AVR (5/29/2020): 21 mm St Doug HP mechanical valve     • Diastolic heart failure (CMS/HCC) [I50.30] Yes     Priority: High   • Type 2 diabetes mellitus without complication, without long-term current use of insulin (CMS/[facility]) [E11.9] Yes     Priority: High     ·  Diet controlled.      • Essential hypertension  [I10] Yes     Priority: Medium   • Hyperlipidemia [E78.5] Yes     Priority: Medium   • Secondary pulmonary arterial hypertension (CMS/HCC) [I27.21] Yes     Priority: Medium     · Right heart catheterization (5/5/2020): RVSP 72 mmHg     • Sleep apnea with use of continuous positive airway pressure (CPAP) [G47.30] Yes     Priority: Low   • Class 3 severe obesity in adult (CMS/HCC) [E66.01] Yes     Priority: Low              Hypertension  · Mildly hypotensive but tolerating lisinopril and metoprolol     Severe aortic stenosis  · s/p mechanical AVR 5/29/2020, Saint Doug valve  · Coumadin with INR managed by pharmacy.  Goal 2.5-3.5.  · Remains subtherapeutic current INR 1.25  · Patient request primary care provider Dr. Mike Mcneil manage her Coumadin dosing INR     Moderate to severe Pulmonary HTN:  ·  re-evaluate after recovery from AVR, already has improvement in PA pressures compared with preop  · Likely multifactorial with elevated left-sided pressures from aortic stenosis as well as PEPE     Chronic diastolic heart failure  · Received IV Lasix yesterday  · Currently appears compensated     Tobacco Abuse  · Congratulated on continued compliance with smoking cessation      · Patient's PCP to manage INRs and Coumadin dosing.  Case management to arrange this at discharge as she will need an INR within a week  · Hopefully home when INR reaches more therapeutic levels  · Please call us with any further questions.  She will follow-up in 2 weeks with her primary cardiologist Dr. Bill Durbin, APRN  6/3/2020

## 2020-06-04 ENCOUNTER — TELEPHONE (OUTPATIENT)
Dept: PHARMACY | Facility: HOSPITAL | Age: 50
End: 2020-06-04

## 2020-06-04 ENCOUNTER — READMISSION MANAGEMENT (OUTPATIENT)
Dept: CALL CENTER | Facility: HOSPITAL | Age: 50
End: 2020-06-04

## 2020-06-04 NOTE — TELEPHONE ENCOUNTER
[facility]ticoagulation clinic called this evening reporting that a patient had been discharged from [facility] yesterday and a coumadin clinic referral was sent to them, however, the patient lives in [place]. I called to speak with Nelly Longoria to see if she would be willing to sign the patients referral since she has a follow-up scheduled with her later in June. Nelly spoke with Dr. Khan and per Nelly they will sign the referral. Spoke with Nelly about patients Lovenox and when they wanted an INR check. The patient had reported in a previous conversation to me that she was taking warfarin 7.5 mg daily and a lovenox injection daily, not twice daily. I let Nelly know this. Lovenox, as far as I can tell, was not on the patients discharge AVS. Nelly reported that Dr. Khan did want the patient do 1mg/kg q12h until INR 2.5 with daily INR's. The patients encounter from [facility] does show Lovenox BID and is written up in progress notes as such. I let her know that we are not in on the weekends to do INR checks and asked if Monday would be okay but she preferred that the patient have an INR check tomorrow, Friday and again on Monday. I spoke with the patient and let her know that her Lovenox injections were every 12 hours (she confirmed this from reading off of the box she had at home) and she reported she had been told to push some of the medication out of the 150 mg syringe. I have asked the patient to bring in her Lovenox to tomorrows appointments so we can look at it and assist her with more education in proper administration and accurate dose.       Thank you,    Alice Still. [place], [facility]  06/04/20  17:41

## 2020-06-04 NOTE — TELEPHONE ENCOUNTER
Spoke with [facility]. They have a CCA with Nelly Longoria's office and will take over management of patient. They plan on reaching out to Nelly for a new referral.     May close episode at this time.

## 2020-06-04 NOTE — OUTREACH NOTE
Prep Survey      Responses   [facility] facility patient discharged from?  [place]   Is LACE score < 7 ?  No   Eligibility  Readm Mgmt   Discharge diagnosis  Aortic stenosis with bicuspid valve s/p mechanical AVR   COVID-19 Test Status  Negative   Does the patient have one of the following disease processes/diagnoses(primary or secondary)?  Other   Does the patient have Home health ordered?  No   Is there a DME ordered?  No   Prep survey completed?  Yes          Margret Hector RN

## 2020-06-05 ENCOUNTER — ANTICOAGULATION VISIT (OUTPATIENT)
Dept: PHARMACY | Facility: HOSPITAL | Age: 50
End: 2020-06-05

## 2020-06-05 DIAGNOSIS — Z95.2 HX OF AORTIC VALVE REPLACEMENT, MECHANICAL: ICD-10-CM

## 2020-06-05 LAB
INR PPP: 2.6 (ref 0.91–1.09)
PROTHROMBIN TIME: 31.2 SECONDS (ref 10–13.8)

## 2020-06-05 PROCEDURE — G0463 HOSPITAL OUTPT CLINIC VISIT: HCPCS | Performed by: PHARMACIST

## 2020-06-05 PROCEDURE — 36416 COLLJ CAPILLARY BLOOD SPEC: CPT

## 2020-06-05 PROCEDURE — 85610 PROTHROMBIN TIME: CPT

## 2020-06-05 RX ORDER — WARFARIN SODIUM 5 MG/1
5 TABLET ORAL NIGHTLY
Qty: 30 TABLET | Refills: 0 | Status: SHIPPED | OUTPATIENT
Start: 2020-06-05 | End: 2020-06-22 | Stop reason: SDUPTHER

## 2020-06-05 NOTE — PROGRESS NOTES
Anticoagulation Clinic Progress Note  Indication: Aortic Mechanical Valve Replacement (Saint Doug Valve)    Referring Provider: Nelly Longoria  Initial Warfarin Start Date: 6/1/2020  Planned Duration of Therapy:indefinite   Goal INR: 2.5-3.5  Current Drug Interactions: aspirin - increase chance of bleeding      Anticoagulation Clinic INR History:  Date 6/1 6/2 6/3 6/5        Total Weekly Dose 5mg *1 dose 7.5mg  daily 7.5mg daily 27.5mg total (6/1-6/4)        INR 1.21 1.24 1.25 2.6            Clinic Interview:  Tablet Strength: pt has 7.5mg and 5mg tablets   Current Dose: pt verified dose of 7.5mg in evening since discharge.    Clinical Outcomes     Negatives:  Major bleeding event, Thromboembolic event, Anticoagulation-related hospital admission, Anticoagulation-related ED visit, Anticoagulation-related fatality   Patient Findings     Negatives:  Signs/symptoms of thrombosis, Signs/symptoms of bleeding, Laboratory test error suspected, Change in health, Change in alcohol use, Change in activity, Upcoming invasive procedure, Emergency department visit, Upcoming dental procedure, Missed doses, Extra doses, Change in medications, Change in diet/appetite, Hospital admission, Bruising, Other complaints           Plan:  1. INR is 2.6. Instructed pt to stop lovenox injections and take warfarin 5mg daily in evening.   2. RTC Monday 6/8/2020  3. Medications: No changes.   4. New warfarin prescription was filled in apothecary and delivered to patient prior to leaving clinic.  5. Verbal and written information provided. Pt expresses understanding and has no further questions at this time.  6.  Patient does smoke but has cut down a lot and will let us know if anything changes.    Keri SHIRLEY.  06/05/20  15:00

## 2020-06-06 ENCOUNTER — READMISSION MANAGEMENT (OUTPATIENT)
Dept: CALL CENTER | Facility: HOSPITAL | Age: 50
End: 2020-06-06

## 2020-06-06 NOTE — OUTREACH NOTE
Medical Week 1 Survey      Responses   [facility] patient discharged from?  [place]   COVID-19 Test Status  Negative   Does the patient have one of the following disease processes/diagnoses(primary or secondary)?  Other   Is there a successful TCM telephone encounter documented?  No   Week 1 attempt successful?  Yes   Call start time  0848   Call end time  0906   Discharge diagnosis  Aortic stenosis with bicuspid valve s/p mechanical AVR   Is patient permission given to speak with other caregiver?  No   Meds reviewed with patient/caregiver?  Yes   Is the patient having any side effects they believe may be caused by any medication additions or changes?  No   Does the patient have all medications ordered at discharge?  Yes   Is the patient taking all medications as directed (includes completed medication regime)?  Yes   Medication comments  Patient reports that she did take one of her lasix pills this am (Saturday),due to increased swelling to feet/ankles. Lasix was discontinued on AVS. Patient to contact  on Monday morning to discuss taking the water pill.    Does the patient have a primary care provider?   Yes   Does the patient have an appointment with their PCP within 7 days of discharge?  Greater than 7 days   Comments regarding PCP  PCP Dr Mcneil. Patient has appt for 6/29/20   What is preventing the patient from scheduling follow up appointments within 7 days of discharge?  -- [Surgical patient following up with Dr Khan, cardiology 6/17/20 and awaiting a call for f/u with surgeon, Dr Ramirez]   Nursing Interventions  Verified appointment date/time/provider   Has the patient kept scheduled appointments due by today?  N/A   Has home health visited the patient within 72 hours of discharge?  N/A   Pulse Ox monitoring  None   Psychosocial issues?  No   Did the patient receive a copy of their discharge instructions?  Yes   Nursing interventions  Reviewed instructions with patient   What is the patient's  perception of their health status since discharge?  Improving [Patient reports that she is improving overall, but does complain of a cough and increased swelling of feet.]   Is the patient/caregiver able to teach back signs and symptoms related to disease process for when to call PCP?  Yes   Is the patient/caregiver able to teach back signs and symptoms related to disease process for when to call 911?  Yes   Is the patient/caregiver able to teach back the hierarchy of who to call/visit for symptoms/problems? PCP, Specialist, Home health nurse, Urgent Care, ED, 911  Yes   Additional teach back comments  Patient to elevate feet, follow low sodium diet. Advised to monitor daily weight for holding excess fluid.    Week 1 call completed?  Yes          Katie Garcia RN

## 2020-06-08 ENCOUNTER — ANTICOAGULATION VISIT (OUTPATIENT)
Dept: PHARMACY | Facility: HOSPITAL | Age: 50
End: 2020-06-08

## 2020-06-08 DIAGNOSIS — Z95.2 HX OF AORTIC VALVE REPLACEMENT, MECHANICAL: ICD-10-CM

## 2020-06-08 LAB
INR PPP: 2.8 (ref 0.91–1.09)
PROTHROMBIN TIME: 33.3 SECONDS (ref 10–13.8)

## 2020-06-08 PROCEDURE — 85610 PROTHROMBIN TIME: CPT

## 2020-06-08 PROCEDURE — 36416 COLLJ CAPILLARY BLOOD SPEC: CPT

## 2020-06-08 PROCEDURE — G0463 HOSPITAL OUTPT CLINIC VISIT: HCPCS | Performed by: PHARMACIST

## 2020-06-08 NOTE — PROGRESS NOTES
Anticoagulation Clinic Progress Note  Indication: Aortic Mechanical Valve Replacement (Saint Doug Valve)    Referring Provider: Nelly Longoria  Initial Warfarin Start Date: 6/1/2020  Planned Duration of Therapy:indefinite   Goal INR: 2.5-3.5  Current Drug Interactions: aspirin - increase chance of bleeding      Anticoagulation Clinic INR History:  Date 6/1 6/2 6/3 6/5 6/8/20       Total Weekly Dose 5mg *1 dose 7.5mg  daily 7.5mg daily 27.5mg total (6/1-6/4) 45mg       INR 1.21 1.24 1.25 2.6 2.8           Clinic Interview:  Tablet Strength: pt has 7.5mg and 5mg tablets   Current Dose: pt verified dose of 5mg since Friday     Clinical Outcomes     Negatives:  Major bleeding event, Thromboembolic event, Anticoagulation-related hospital admission, Anticoagulation-related ED visit, Anticoagulation-related fatality   Patient Findings     Negatives:  Signs/symptoms of thrombosis, Signs/symptoms of bleeding, Laboratory test error suspected, Change in health, Change in alcohol use, Change in activity, Upcoming invasive procedure, Emergency department visit, Upcoming dental procedure, Missed doses, Extra doses, Change in medications, Change in diet/appetite, Hospital admission, Bruising, Other complaints       Plan:  1. INR is 2.8. Instructed pt to continue 5mg nightly.   2. RTC Thursday   3. Medications: No changes.   4. Pt declined refills   5. Verbal and written information provided. Pt expresses understanding and has no further questions at this time.    Miranda Leyva, [facility], located within [facility]  06/08/20  12:59

## 2020-06-09 NOTE — DISCHARGE SUMMARY
CTS Discharge Summary    Patient Care Team:  Kreis, Samuel Duane, MD as PCP - General (Family Medicine)  Lewis Khan MD as Cardiologist (Cardiology)  Consults:   Consults     Date and Time Order Name Status Description    5/29/2020 1137 Inpatient Consult to Cardiology Completed           Date of Admission: 5/29/2020  5:22 AM  Date of Discharge:  6/3/2020    Discharge Diagnosis  Past Medical History:   Diagnosis Date   • Aortic valve stenosis    • CHF (congestive heart failure) (CMS/HCC)    • Heart murmur    • Hyperlipidemia    • Hypertension    • Low back pain    • Neuropathy    • Obesity    • Pulmonary hypertension (CMS/HCC)    • Sleep apnea with use of continuous positive airway pressure (CPAP)    • Type 2 diabetes mellitus (CMS/HCC)      diet controlled.    • Wears glasses      Patient Active Problem List   Diagnosis   • Aortic stenosis with bicuspid valve status post mechanical AVR   • Class 3 severe obesity in adult (CMS/HCC)   • Tobacco abuse   • Type 2 diabetes mellitus without complication, without long-term current use of insulin (CMS/HCC)   • Palpitations   • Bilateral carotid bruits   • Essential hypertension   • Hyperlipidemia   • Sleep apnea with use of continuous positive airway pressure (CPAP)   • Secondary pulmonary arterial hypertension (CMS/HCC)   • Diastolic heart failure (CMS/HCC)   • Impaired functional mobility, balance, gait, and endurance   • Hx of aortic valve replacement, mechanical       Aortic stenosis with bicuspid valve [Q23.0, Q23.1]  Aortic valve disorder [I35.9]     Procedures Performed  Procedure(s):  Mediansternotomy, AORTIC VALVE REPLACEMENT WITH Transesophageal echocardiogram     History of Present Illness  Patient is a 49 y.o. female with a history of hypertension, hyperlipidemia, diabetes mellitus, congestive heart failure, obesity and tobacco abuse who presents with fatigue.  She has noticed worsening fatigue and shortness of breath since January in the setting of severe  aortic valve stenosis..  The patient has dyspnea when ambulating approximately 200 feet.  These symptoms have slightly improved with Lasix administration that she received while admitted to [facility] recently for congestive heart failure.  She does have occasional chest pain that she describes as an achy substernal pain that is present at rest.  She does have some dizziness with activities, but denies syncope.  Mrs. Conteh has noticed bilateral lower extremity swelling.    Hospital Course   Patient was taken to the operating room on 5/29/20 for aortic valve replacement with a 21 mm St Doug HP mechanical valve.  She was transported to cardiac ICU intubated and in stable condition.  Patient extubated per ICU protocol.  POD 1: Ambulating with PT. [place] removed.  POD 2:  Coumadin started.  Awaiting telemetry bed.  Chest tubes and pacing wires removed. Diuresed.   POD 3:  Transferred to telemetry  POD 4:  Bowel regimen. INR subtherapeutic. Ambulating well.\  POD 5:  Patient met discharge criteria and was discharged home on Lovenox bridge until INR is therapeutic at 2.5-3.5.  Patient will present to PCP's office on 6/8/20 for INR check and PCP will manage Coumadin dosing.    Discharge Medications     Discharge Medications      New Medications      Instructions Start Date   atorvastatin 40 MG tablet  Commonly known as:  LIPITOR   40 mg, Oral, Nightly      HYDROcodone-acetaminophen 7.5-325 MG per tablet  Commonly known as:  NORCO   1 tablet, Oral, Every 6 Hours PRN      metoprolol tartrate 25 MG tablet  Commonly known as:  LOPRESSOR   25 mg, Oral, Every 12 Hours Scheduled         Continue These Medications      Instructions Start Date   aspirin 81 MG tablet   81 mg, Oral, Daily      KLOR-CON 10 MEQ CR tablet  Generic drug:  potassium chloride   10 mEq, Oral, Daily      lisinopril 10 MG tablet  Commonly known as:  PRINIVIL,ZESTRIL   10 mg, Oral, Daily      ProAir  (90 Base) MCG/ACT inhaler  Generic drug:   albuterol sulfate HFA   2 puffs q4h prn wheezing         Stop These Medications    furosemide 40 MG tablet  Commonly known as:  LASIX            Discharge Diet:   Diet Instructions     Diet: Consistent Carbohydrate, Cardiac      Discharge Diet:   Consistent Carbohydrate  Cardiac             Activity at Discharge:   Activity Instructions     Bathing Restrictions      Type of Restriction:  Bathing    Bathing Restrictions:  No Tub Bath    Driving Restrictions      Type of Restriction:  Driving    Driving Restrictions:  No Driving While Taking Narcotics    Lifting Restrictions      Type of Restriction:  Lifting    Lifting Restrictions:  Lifting Restriction (Indicate Limit)    Weight Limit (Pounds):  10    Length of Lifting Restriction:  until next appointment          Follow-up Appointments  Future Appointments   Date Time Provider Department Center   6/11/2020 10:00 AM [facility]  [facility]   6/17/2020  1:00 PM Nelly Longoria APRN [facility] None   7/1/2020  1:45 PM Meghan Aguilera APRN [facility] None           Gwen Ponce PA-C  06/09/20  11:47

## 2020-06-11 ENCOUNTER — ANTICOAGULATION VISIT (OUTPATIENT)
Dept: PHARMACY | Facility: HOSPITAL | Age: 50
End: 2020-06-11

## 2020-06-11 ENCOUNTER — LAB (OUTPATIENT)
Dept: LAB | Facility: HOSPITAL | Age: 50
End: 2020-06-11

## 2020-06-11 DIAGNOSIS — Z95.2 HX OF AORTIC VALVE REPLACEMENT, MECHANICAL: ICD-10-CM

## 2020-06-11 LAB
INR PPP: 3.39 (ref 0.9–1.1)
INR PPP: 4.5 (ref 0.91–1.09)
PROTHROMBIN TIME: 34.2 SECONDS (ref 11.9–14.1)
PROTHROMBIN TIME: 54.6 SECONDS (ref 10–13.8)

## 2020-06-11 PROCEDURE — 36416 COLLJ CAPILLARY BLOOD SPEC: CPT

## 2020-06-11 PROCEDURE — 36415 COLL VENOUS BLD VENIPUNCTURE: CPT

## 2020-06-11 PROCEDURE — 85610 PROTHROMBIN TIME: CPT

## 2020-06-11 PROCEDURE — G0463 HOSPITAL OUTPT CLINIC VISIT: HCPCS | Performed by: PHARMACIST

## 2020-06-11 NOTE — PROGRESS NOTES
Anticoagulation Clinic Progress Note  Indication: Aortic Mechanical Valve Replacement (Saint Doug Valve)    Referring Provider: Nelly Longoria  Initial Warfarin Start Date: 6/1/2020  Planned Duration of Therapy:indefinite   Goal INR: 2.5-3.5  Current Drug Interactions: aspirin - increase chance of bleeding      Patient home phone: [phone]    Anticoagulation Clinic INR History:  Date 6/1 6/2 6/3 6/5 6/8/20 6/11/20     Total Weekly Dose 5mg *1 dose 7.5mg  daily 7.5mg daily 27.5mg total (6/1-6/4) 45mg 37.5 mg     INR 1.21 1.24 1.25 2.6 2.8 4.5 POC/3.39          Clinic Interview:  Tablet Strength: pt has 7.5mg and 5mg tablets   Current Dose: pt verified dose of 5mg daily     Clinical Outcomes     Negatives:  Major bleeding event, Thromboembolic event, Anticoagulation-related hospital admission, Anticoagulation-related ED visit, Anticoagulation-related fatality   Comments:  Patient reports swelling in lower legs but reports she had a PCP visit yesterday and they are aware and told her to speak with cardiology this coming week. Patient denies redness, sore to touch, knot, etc and is used to having fluid. Encouraged to monitor closely and seek emergency attention if needed or call cardiology before appt if needed.    Patient Findings     Positives:  Other complaints   Negatives:  Signs/symptoms of thrombosis, Signs/symptoms of bleeding, Laboratory test error suspected, Change in health, Change in alcohol use, Change in activity, Upcoming invasive procedure, Emergency department visit, Upcoming dental procedure, Missed doses, Extra doses, Change in medications, Change in diet/appetite, Hospital admission, Bruising   Comments:  Patient reports swelling in lower legs but reports she had a PCP visit yesterday and they are aware and told her to speak with cardiology this coming week. Patient denies redness, sore to touch, knot, etc and is used to having fluid. Encouraged to monitor closely and seek emergency attention  if needed or call cardiology before appt if needed.          Plan:  1. INR is 4.5 POC/ 3.39 . Instructed pt to take 3.75 mg tonight and Saturday then 5 mg Friday/Sunday. Pt currently has 5 mg and 7.5 mg tablets at home.   2. RTC Monday   3. Medications: No changes.   4. Pt declined refills   5. Verbal and written information provided. Pt expresses understanding and has no further questions at this time.    Alice Still. [facility]  06/11/20  10:02

## 2020-06-12 ENCOUNTER — READMISSION MANAGEMENT (OUTPATIENT)
Dept: CALL CENTER | Facility: HOSPITAL | Age: 50
End: 2020-06-12

## 2020-06-12 NOTE — OUTREACH NOTE
Medical Week 2 Survey      Responses   [facility] patient discharged from?  [place]   COVID-19 Test Status  Negative   Does the patient have one of the following disease processes/diagnoses(primary or secondary)?  Other   Week 2 attempt successful?  No   Unsuccessful attempts  Attempt 1          Courtney Mejia RN

## 2020-06-15 ENCOUNTER — ANTICOAGULATION VISIT (OUTPATIENT)
Dept: PHARMACY | Facility: HOSPITAL | Age: 50
End: 2020-06-15

## 2020-06-15 DIAGNOSIS — Z95.2 HX OF AORTIC VALVE REPLACEMENT, MECHANICAL: ICD-10-CM

## 2020-06-15 LAB
INR PPP: 3 (ref 0.91–1.09)
PROTHROMBIN TIME: 36.5 SECONDS (ref 10–13.8)

## 2020-06-15 PROCEDURE — 85610 PROTHROMBIN TIME: CPT

## 2020-06-15 PROCEDURE — G0463 HOSPITAL OUTPT CLINIC VISIT: HCPCS | Performed by: PHARMACIST

## 2020-06-15 PROCEDURE — 36416 COLLJ CAPILLARY BLOOD SPEC: CPT

## 2020-06-15 NOTE — PROGRESS NOTES
Anticoagulation Clinic Progress Note  Indication: Aortic Mechanical Valve Replacement (Saint Doug Valve)    Referring Provider: Nelly Longoria  Initial Warfarin Start Date: 6/1/2020  Planned Duration of Therapy:indefinite   Goal INR: 2.5-3.5  Current Drug Interactions: aspirin - increase chance of bleeding      Patient home phone: [phone]    Anticoagulation Clinic INR History:  Date 6/1 6/2 6/3 6/5 6/8/20 6/11/20 6/15/20    Total Weekly Dose 5mg *1 dose 7.5mg  daily 7.5mg daily 27.5mg total (6/1-6/4) 45mg 37.5 mg 32.5mg    INR 1.21 1.24 1.25 2.6 2.8 4.5 POC/3.39  3.0        Clinic Interview:  Tablet Strength: pt has 7.5mg and 5mg tablets   Current Dose: pt verified dose of 3.75mg and 5mg alternating     Clinical Outcomes     Negatives:  Major bleeding event, Thromboembolic event, Anticoagulation-related hospital admission, Anticoagulation-related ED visit, Anticoagulation-related fatality   Patient Findings     Negatives:  Signs/symptoms of thrombosis, Signs/symptoms of bleeding, Laboratory test error suspected, Change in health, Change in alcohol use, Change in activity, Upcoming invasive procedure, Emergency department visit, Upcoming dental procedure, Missed doses, Extra doses, Change in medications, Change in diet/appetite, Hospital admission, Bruising, Other complaints     Plan:  1. INR is 3.0. Instructed pt to take 3.75 mg tonight and Wednesday and 5mg Tuesday   2. RTC Thursday    3. Medications: No changes.   4. Pt declined refills   5. Verbal and written information provided. Pt expresses understanding and has no further questions at this time.    Miranda Leyva, [facility]  06/15/20  10:45

## 2020-06-16 ENCOUNTER — READMISSION MANAGEMENT (OUTPATIENT)
Dept: CALL CENTER | Facility: HOSPITAL | Age: 50
End: 2020-06-16

## 2020-06-16 NOTE — OUTREACH NOTE
"Medical Week 2 Survey      Responses   [facility] patient discharged from?  [place]   COVID-19 Test Status  Negative   Does the patient have one of the following disease processes/diagnoses(primary or secondary)?  Other   Week 2 attempt successful?  Yes   Call start time  1637   Discharge diagnosis  Aortic stenosis with bicuspid valve s/p mechanical AVR   Call end time  1639   Meds reviewed with patient/caregiver?  Yes   Is the patient taking all medications as directed (includes completed medication regime)?  Yes   Does the patient have a primary care provider?   Yes   Comments regarding PCP  Pt has seen PCP since hospital d/c   Has the patient kept scheduled appointments due by today?  Yes   Pulse Ox monitoring  None   What is the patient's perception of their health status since discharge?  Improving [Pt reports, \"still sore, but improving\"]   If the patient is a current smoker, are they able to teach back resources for cessation?  Smoking cessation medications [Smoker]   Week 2 Call Completed?  Yes          Kimberly Morrell RN  "

## 2020-06-17 ENCOUNTER — OFFICE VISIT (OUTPATIENT)
Dept: CARDIOLOGY | Facility: CLINIC | Age: 50
End: 2020-06-17

## 2020-06-17 VITALS
WEIGHT: 293 LBS | HEART RATE: 94 BPM | TEMPERATURE: 98.5 F | BODY MASS INDEX: 43.4 KG/M2 | DIASTOLIC BLOOD PRESSURE: 65 MMHG | HEIGHT: 69 IN | SYSTOLIC BLOOD PRESSURE: 105 MMHG | OXYGEN SATURATION: 97 %

## 2020-06-17 DIAGNOSIS — Q23.1 AORTIC STENOSIS WITH BICUSPID VALVE: Primary | ICD-10-CM

## 2020-06-17 DIAGNOSIS — Z72.0 TOBACCO ABUSE: ICD-10-CM

## 2020-06-17 DIAGNOSIS — I10 ESSENTIAL HYPERTENSION: ICD-10-CM

## 2020-06-17 DIAGNOSIS — E11.9 TYPE 2 DIABETES MELLITUS WITHOUT COMPLICATION, WITHOUT LONG-TERM CURRENT USE OF INSULIN (HCC): ICD-10-CM

## 2020-06-17 DIAGNOSIS — Q23.0 AORTIC STENOSIS WITH BICUSPID VALVE: Primary | ICD-10-CM

## 2020-06-17 PROCEDURE — 99213 OFFICE O/P EST LOW 20 MIN: CPT | Performed by: NURSE PRACTITIONER

## 2020-06-17 RX ORDER — FUROSEMIDE 40 MG/1
20 TABLET ORAL DAILY
COMMUNITY

## 2020-06-17 NOTE — PROGRESS NOTES
Kreis, Samuel Duane, MD  Luisa Conteh  1970 06/17/2020    Patient Active Problem List   Diagnosis   • Aortic stenosis with bicuspid valve status post mechanical AVR   • Class 3 severe obesity in adult (CMS/HCC)   • Tobacco abuse   • Type 2 diabetes mellitus without complication, without long-term current use of insulin (CMS/HCC)   • Palpitations   • Bilateral carotid bruits   • Essential hypertension   • Hyperlipidemia   • Sleep apnea with use of continuous positive airway pressure (CPAP)   • Secondary pulmonary arterial hypertension (CMS/HCC)   • Diastolic heart failure (CMS/HCC)   • Impaired functional mobility, balance, gait, and endurance   • Hx of aortic valve replacement, mechanical       Dear Kreis, Samuel Duane, MD:    Subjective     Chief Complaint   Patient presents with   • Aortic Stenosis   • Shortness of Breath   • Diabetes           History of Present Illness:    Luisa Conteh is a 49 y.o. female with a past medical history significant for diastolic heart failure associated with severe aortic stenosis with a bicuspid aortic valve.  She also has a history of diabetes mellitus type 2, hyperlipidemia, and tobacco abuse. She presents today for hospital follow-up.  She was admitted to [facility] on 5/29/2020 and underwent aortic valve replacement with a 21 mm Saint Doug HP mechanical valve.  There were apparently no complications during the procedure.  The patient has been on warfarin for anticoagulation and is now therapeutic, being followed by the warfarin clinic at [facility].  She has some soreness around her surgical site.  She denies any chest pains, shortness of breath, palpitations, dizziness, or lightheadedness.  She denies any lower extremity edema or weight gain.  She has lost 10 pounds since her last visit.  Unfortunately, she has started smoking again.          No Known Allergies:      Current Outpatient Medications:   •  albuterol sulfate HFA  (PROAIR HFA) 108 (90 Base) MCG/ACT inhaler, 2 puffs q4h prn wheezing, Disp: , Rfl:   •  aspirin 81 MG tablet, Take 1 tablet by mouth Daily., Disp: 30 tablet, Rfl: 11  •  atorvastatin (LIPITOR) 40 MG tablet, Take 1 tablet by mouth Every Night., Disp: 30 tablet, Rfl: 3  •  furosemide (LASIX) 40 MG tablet, Take 40 mg by mouth Daily., Disp: , Rfl:   •  HYDROcodone-acetaminophen (NORCO) 7.5-325 MG per tablet, Take 1 tablet by mouth Every 6 (Six) Hours As Needed for Moderate Pain  or Severe Pain ., Disp: 30 tablet, Rfl: 0  •  lisinopril (PRINIVIL,ZESTRIL) 10 MG tablet, Take 10 mg by mouth Daily., Disp: , Rfl:   •  metoprolol tartrate (LOPRESSOR) 25 MG tablet, Take 1 tablet by mouth Every 12 (Twelve) Hours., Disp: 60 tablet, Rfl: 0  •  potassium chloride (KLOR-CON) 10 MEQ CR tablet, Take 10 mEq by mouth Daily., Disp: , Rfl:   •  warfarin (Coumadin) 5 MG tablet, Take 1 tablet by mouth Every Night., Disp: 30 tablet, Rfl: 0  No current facility-administered medications for this visit.     Facility-Administered Medications Ordered in Other Visits:   •  Chlorhexidine Gluconate Cloth 2 % pads 1 application, 1 application, Topical, Q12H PRN, Xavi Oliveros PA      The following portions of the patient's history were reviewed and updated as appropriate: allergies, current medications, past family history, past medical history, past social history, past surgical history and problem list.    Social History     Tobacco Use   • Smoking status: Former Smoker     Packs/day: 0.25     Years: 30.00     Pack years: 7.50     Types: Cigarettes     Last attempt to quit: 2020     Years since quittin.4   • Smokeless tobacco: Never Used   • Tobacco comment: Quit in 2020   Substance Use Topics   • Alcohol use: No   • Drug use: No       Review of Systems   Constitution: Negative for decreased appetite and malaise/fatigue.   Cardiovascular: Negative for chest pain, dyspnea on exertion, irregular heartbeat, leg swelling, near-syncope,  "orthopnea, palpitations, paroxysmal nocturnal dyspnea and syncope.   Respiratory: Negative for cough, shortness of breath and wheezing.    Neurological: Negative for dizziness, light-headedness and weakness.       Objective   Vitals:    06/17/20 1300   BP: 105/65   BP Location: Right arm   Pulse: 94   Temp: 98.5 °F (36.9 °C)   SpO2: 97%   Weight: (!) 137 kg (302 lb 9.6 oz)   Height: 175.3 cm (69\")     Body mass index is 44.69 kg/m².        Physical Exam   Constitutional: She is oriented to person, place, and time. She appears well-developed and well-nourished.   HENT:   Head: Normocephalic and atraumatic.   Cardiovascular: Normal rate, regular rhythm and normal heart sounds. Exam reveals no S3 and no S4.   No murmur heard.  Pulses:       Dorsalis pedis pulses are 2+ on the right side, and 2+ on the left side.        Posterior tibial pulses are 2+ on the right side, and 2+ on the left side.   Pulmonary/Chest: Effort normal and breath sounds normal. She has no wheezes. She has no rales.   Surgical site noted, well approximated, no erythema or drainage.   Abdominal: Soft. Bowel sounds are normal.   Musculoskeletal: She exhibits no edema.   Neurological: She is alert and oriented to person, place, and time.   Skin: Skin is warm and dry.   Psychiatric: She has a normal mood and affect. Her behavior is normal.       Lab Results   Component Value Date     (L) 06/02/2020    K 4.0 06/02/2020    CL 97 (L) 06/02/2020    CO2 26.0 06/02/2020    BUN 28 (H) 06/02/2020    CREATININE 0.80 06/02/2020    GLUCOSE 102 (H) 06/02/2020    CALCIUM 8.9 06/02/2020    AST 15 05/26/2020    ALT 11 05/26/2020    ALKPHOS 91 05/26/2020    LABIL2 1.2 (L) 11/05/2014        Lab Results   Component Value Date    WBC 14.52 (H) 06/01/2020    HGB 11.5 (L) 06/01/2020    HCT 38.8 06/01/2020     (L) 06/01/2020     Lab Results   Component Value Date    INR 3.0 (H) 06/15/2020    INR 3.39 (H) 06/11/2020    INR 4.5 (C) 06/11/2020     Lab Results "   Component Value Date    MG 2.8 (H) 05/29/2020     Lab Results   Component Value Date    TRIG 69 05/29/2020    HDL 28 (L) 05/29/2020    LDL 69 05/29/2020             Procedures      Assessment/Plan    Diagnosis Plan   1. Aortic stenosis with bicuspid valve status post mechanical AVR     2. Essential hypertension     3. Type 2 diabetes mellitus without complication, without long-term current use of insulin (CMS/[facility])     4. Tobacco abuse                  Recommendations:    1. I have instructed her to keep her follow up with CT surgery as scheduled.    2. I have advised the patient to stop smoking and explained the risks of continued tobacco abuse.     3. Continue with warfarin management through the Coumadin clinic. Target INR 2.5-3.5.    4. Follow up in 3 months or sooner if needed.      Return in about 3 months (around 9/17/2020) for Recheck.    As always, I appreciate very much the opportunity to participate in the cardiovascular care of your patients.      With Best Regards,    DAKSHA Nelson

## 2020-06-18 ENCOUNTER — ANTICOAGULATION VISIT (OUTPATIENT)
Dept: PHARMACY | Facility: HOSPITAL | Age: 50
End: 2020-06-18

## 2020-06-18 ENCOUNTER — DOCUMENTATION (OUTPATIENT)
Dept: CARDIAC REHAB | Facility: HOSPITAL | Age: 50
End: 2020-06-18

## 2020-06-18 DIAGNOSIS — Z95.2 HX OF AORTIC VALVE REPLACEMENT, MECHANICAL: ICD-10-CM

## 2020-06-18 LAB
INR PPP: 2.7 (ref 0.91–1.09)
PROTHROMBIN TIME: 32.3 SECONDS (ref 10–13.8)

## 2020-06-18 PROCEDURE — 85610 PROTHROMBIN TIME: CPT

## 2020-06-18 PROCEDURE — G0463 HOSPITAL OUTPT CLINIC VISIT: HCPCS | Performed by: PHARMACIST

## 2020-06-18 PROCEDURE — 36416 COLLJ CAPILLARY BLOOD SPEC: CPT

## 2020-06-18 NOTE — PROGRESS NOTES
Anticoagulation Clinic Progress Note  Indication: Aortic Mechanical Valve Replacement (Saint Doug Valve)    Referring Provider: Nelly Longoria  Initial Warfarin Start Date: 6/1/2020  Planned Duration of Therapy:indefinite   Goal INR: 2.5-3.5  Current Drug Interactions: aspirin - increase chance of bleeding      Patient home phone: [phone]    Anticoagulation Clinic INR History:  Date 6/1 6/2 6/3 6/5 6/8/20 6/11/20 6/15/20    Total Weekly Dose 5mg *1 dose 7.5mg  daily 7.5mg daily 27.5mg total (6/1-6/4) 45mg 37.5 mg 32.5mg    INR 1.21 1.24 1.25 2.6 2.8 4.5 POC/3.39  3.0      Date 6/18   Total Weekly Dose 30mg   INR 2.7       Clinic Interview:  Tablet Strength: pt has 7.5mg and 5mg tablets   Current Dose: pt verified dose of 3.75mg and 5mg alternating     Clinical Outcomes     Negatives:  Major bleeding event, Thromboembolic event, Anticoagulation-related hospital admission, Anticoagulation-related ED visit, Anticoagulation-related fatality   Patient Findings     Negatives:  Signs/symptoms of thrombosis, Signs/symptoms of bleeding, Laboratory test error suspected, Change in health, Change in alcohol use, Change in activity, Upcoming invasive procedure, Emergency department visit, Upcoming dental procedure, Missed doses, Extra doses, Change in medications, Change in diet/appetite, Hospital admission, Bruising, Other complaints     Plan:  1. INR is 2.7. Instructed pt to take 3.75mg MWF and 5mg ROW   2. RTC Monday     3. Medications: No changes.   4. Pt declined refills   5. Verbal and written information provided. Pt expresses understanding and has no further questions at this time.    Miranda Leyva, [facility]  06/18/20  11:20

## 2020-06-22 ENCOUNTER — ANTICOAGULATION VISIT (OUTPATIENT)
Dept: PHARMACY | Facility: HOSPITAL | Age: 50
End: 2020-06-22

## 2020-06-22 DIAGNOSIS — Z95.2 HX OF AORTIC VALVE REPLACEMENT, MECHANICAL: ICD-10-CM

## 2020-06-22 LAB
INR PPP: 2.7 (ref 0.91–1.09)
PROTHROMBIN TIME: 32.8 SECONDS (ref 10–13.8)

## 2020-06-22 PROCEDURE — G0463 HOSPITAL OUTPT CLINIC VISIT: HCPCS | Performed by: PHARMACIST

## 2020-06-22 PROCEDURE — 85610 PROTHROMBIN TIME: CPT

## 2020-06-22 PROCEDURE — 36416 COLLJ CAPILLARY BLOOD SPEC: CPT

## 2020-06-22 RX ORDER — WARFARIN SODIUM 5 MG/1
5 TABLET ORAL NIGHTLY
Qty: 30 TABLET | Refills: 0 | Status: SHIPPED | OUTPATIENT
Start: 2020-06-22

## 2020-06-22 RX ORDER — WARFARIN SODIUM 7.5 MG/1
TABLET ORAL
Qty: 15 TABLET | Refills: 0 | Status: SHIPPED | OUTPATIENT
Start: 2020-06-22

## 2020-06-22 NOTE — PROGRESS NOTES
Anticoagulation Clinic Progress Note  Indication: Aortic Mechanical Valve Replacement (Saint Doug Valve)    Referring Provider: Nelly Longoria  Initial Warfarin Start Date: 6/1/2020  Planned Duration of Therapy:indefinite   Goal INR: 2.5-3.5  Current Drug Interactions: aspirin - increase chance of bleeding      Patient home phone: [phone]    Anticoagulation Clinic INR History:  Date 6/1 6/2 6/3 6/5 6/8/20 6/11/20 6/15/20    Total Weekly Dose 5mg *1 dose 7.5mg  daily 7.5mg daily 27.5mg total (6/1-6/4) 45mg 37.5 mg 32.5mg    INR 1.21 1.24 1.25 2.6 2.8 4.5 POC/3.39  3.0      Date 6/18 6/22   Total Weekly Dose 30mg 31.25mg   INR 2.7 2.7       Clinic Interview:  Tablet Strength: pt has 7.5mg and 5mg tablets   Current Dose: pt verified dose of 3.75mg Friday and 5mg ROW     Clinical Outcomes     Negatives:  Major bleeding event, Thromboembolic event, Anticoagulation-related hospital admission, Anticoagulation-related ED visit, Anticoagulation-related fatality   Patient Findings     Negatives:  Signs/symptoms of thrombosis, Signs/symptoms of bleeding, Laboratory test error suspected, Change in health, Change in alcohol use, Change in activity, Upcoming invasive procedure, Emergency department visit, Upcoming dental procedure, Missed doses, Extra doses, Change in medications, Change in diet/appetite, Hospital admission, Bruising, Other complaints     Plan:  1. INR is 2.7. Instructed pt to take 3.75mg MWF and 5mg ROW   2. RTC Monday     3. Medications: No changes.   4. Pt declined refills   5. Verbal and written information provided. Pt expresses understanding and has no further questions at this time.    Miranda Leyva, [facility]  06/22/20  10:07

## 2020-06-23 ENCOUNTER — READMISSION MANAGEMENT (OUTPATIENT)
Dept: CALL CENTER | Facility: HOSPITAL | Age: 50
End: 2020-06-23

## 2020-06-23 NOTE — OUTREACH NOTE
Medical Week 3 Survey      Responses   [facility] patient discharged from?  [place]   COVID-19 Test Status  Negative   Does the patient have one of the following disease processes/diagnoses(primary or secondary)?  Other   Week 3 attempt successful?  Yes   Call start time  1122   Call end time  1127   Discharge diagnosis  Aortic stenosis with bicuspid valve s/p mechanical AVR   Meds reviewed with patient/caregiver?  Yes   Is the patient taking all medications as directed (includes completed medication regime)?  Yes   Has the patient kept scheduled appointments due by today?  Yes   Pulse Ox monitoring  None   What is the patient's perception of their health status since discharge?  Improving   Additional teach back comments  Edema to legs and feet has gone all the way down.     Week 3 Call Completed?  Yes   Wrap up additional comments  Counseled to do daily weights. Pt asked when she can drive, advised to ask surgeon but with having open chest procedure I would not think he would allow it right now. Has appt 7/1 with CT surgery PA.             Lizbeth Kaiser RN

## 2020-06-24 DIAGNOSIS — Z95.2 S/P AVR (AORTIC VALVE REPLACEMENT): Primary | ICD-10-CM

## 2020-06-29 ENCOUNTER — ANTICOAGULATION VISIT (OUTPATIENT)
Dept: PHARMACY | Facility: HOSPITAL | Age: 50
End: 2020-06-29

## 2020-06-29 DIAGNOSIS — Z95.2 HX OF AORTIC VALVE REPLACEMENT, MECHANICAL: ICD-10-CM

## 2020-06-29 LAB
INR PPP: 2.6 (ref 0.91–1.09)
PROTHROMBIN TIME: 31.7 SECONDS (ref 10–13.8)

## 2020-06-29 PROCEDURE — G0463 HOSPITAL OUTPT CLINIC VISIT: HCPCS | Performed by: PHARMACIST

## 2020-06-29 PROCEDURE — 85610 PROTHROMBIN TIME: CPT

## 2020-06-29 PROCEDURE — 36416 COLLJ CAPILLARY BLOOD SPEC: CPT

## 2020-06-29 NOTE — PROGRESS NOTES
Anticoagulation Clinic Progress Note  Indication: Aortic Mechanical Valve Replacement (Saint Doug Valve)    Referring Provider: Nelly Longoria  Initial Warfarin Start Date: 6/1/2020  Planned Duration of Therapy:indefinite   Goal INR: 2.5-3.5  Current Drug Interactions: aspirin - increase chance of bleeding      Patient home phone: [phone]    Anticoagulation Clinic INR History:  Date 6/1 6/2 6/3 6/5 6/8/20 6/11/20 6/15/20    Total Weekly Dose 5mg *1 dose 7.5mg  daily 7.5mg daily 27.5mg total (6/1-6/4) 45mg 37.5 mg 32.5mg    INR 1.21 1.24 1.25 2.6 2.8 4.5 POC/3.39  3.0      Date 6/18 6/22 6/29/20   Total Weekly Dose 30mg 31.25mg 31.25 mg   INR 2.7 2.7 2.6       Clinic Interview:  Tablet Strength: pt has 7.5mg and 5mg tablets   Current Dose: pt verified dose of 3.75mg MWF and 5mg ROW     Clinical Outcomes     Negatives:  Major bleeding event, Thromboembolic event, Anticoagulation-related hospital admission, Anticoagulation-related ED visit, Anticoagulation-related fatality   Patient Findings     Negatives:  Signs/symptoms of thrombosis, Signs/symptoms of bleeding, Laboratory test error suspected, Change in health, Change in alcohol use, Change in activity, Upcoming invasive procedure, Emergency department visit, Upcoming dental procedure, Missed doses, Extra doses, Change in medications, Change in diet/appetite, Hospital admission, Bruising, Other complaints     Plan:  1. INR is 2.6. Instructed pt to continue to take 3.75mg MWF and 5mg ROW   2. RTC 1 week   3. Medications: No changes.   4. Pt declined refills   5. Verbal and written information provided. Pt expresses understanding and has no further questions at this time.    Alice Still. Bebo, [facility]  06/29/20  14:14

## 2020-06-30 ENCOUNTER — READMISSION MANAGEMENT (OUTPATIENT)
Dept: CALL CENTER | Facility: HOSPITAL | Age: 50
End: 2020-06-30

## 2020-06-30 NOTE — OUTREACH NOTE
Medical Week 4 Survey      Responses   [facility] patient discharged from?  [place]   COVID-19 Test Status  Negative   Does the patient have one of the following disease processes/diagnoses(primary or secondary)?  Other   Week 4 attempt successful?  No          Costa Ferreira RN

## 2020-07-01 ENCOUNTER — OFFICE VISIT (OUTPATIENT)
Dept: CARDIAC SURGERY | Facility: CLINIC | Age: 50
End: 2020-07-01

## 2020-07-01 ENCOUNTER — HOSPITAL ENCOUNTER (OUTPATIENT)
Dept: GENERAL RADIOLOGY | Facility: HOSPITAL | Age: 50
Discharge: HOME | End: 2020-07-01
Admitting: NURSE PRACTITIONER

## 2020-07-01 VITALS
WEIGHT: 293 LBS | HEART RATE: 80 BPM | BODY MASS INDEX: 43.4 KG/M2 | DIASTOLIC BLOOD PRESSURE: 71 MMHG | OXYGEN SATURATION: 97 % | TEMPERATURE: 98.1 F | HEIGHT: 69 IN | SYSTOLIC BLOOD PRESSURE: 116 MMHG

## 2020-07-01 DIAGNOSIS — Z95.2 S/P AVR (AORTIC VALVE REPLACEMENT): Primary | ICD-10-CM

## 2020-07-01 DIAGNOSIS — Z95.2 S/P AVR (AORTIC VALVE REPLACEMENT): ICD-10-CM

## 2020-07-01 PROCEDURE — 99024 POSTOP FOLLOW-UP VISIT: CPT | Performed by: NURSE PRACTITIONER

## 2020-07-01 PROCEDURE — 71046 X-RAY EXAM CHEST 2 VIEWS: CPT

## 2020-07-01 PROCEDURE — 71046 X-RAY EXAM CHEST 2 VIEWS: CPT | Performed by: RADIOLOGY

## 2020-07-01 NOTE — PROGRESS NOTES
[facility] Cardiothoracic Surgery Follow-Up Note    Name:  Luisa Conteh  MRN Number:  [id number]  Date of Encounter:  07/01/2020    Referred By:  No ref. provider found  PCP:  Kreis, Samuel Duane, MD    Chief Complaint:    Chief Complaint   Patient presents with   • Post-op Follow-up     Hospital follow-up s/p AVR 5/29/2020 aortic stenosis. Patient had chest xray today 7/1/2020.       History of Present Illness:    Ms. Luisa Conteh is a pleasant 49 y.o. female with a history of hypertension, hyperlipidemia, diabetes mellitus, congestive heart failure, obesity, tobacco abuse and severe aortic valve stenosis status post aortic valve replacement with a 21 mm Saint Doug HP mechanical valve 5/29/2020 per Dr. Ramirez who returns the office today for postoperative exam.  Patient denies incisional pain, chest pain, worsening shortness of breath or difficulty with ambulation.  She does continue, however to have some fatigue and shortness of breath.    Review of Systems:  Review of Systems   Constitution: Positive for malaise/fatigue. Negative for chills and fever.   Eyes: Negative for visual disturbance.   Cardiovascular: Positive for dyspnea on exertion. Negative for chest pain and leg swelling.   Respiratory: Positive for shortness of breath. Negative for hemoptysis.    Skin: Negative for poor wound healing.   Gastrointestinal: Negative for abdominal pain.   Neurological: Negative for weakness.   Psychiatric/Behavioral: Negative for altered mental status.       Past Medical History:    Past Medical History:   Diagnosis Date   • Aortic valve stenosis    • CHF (congestive heart failure) (CMS/HCC)    • Heart murmur    • Hyperlipidemia    • Hypertension    • Low back pain    • Neuropathy    • Obesity    • Pulmonary hypertension (CMS/HCC)    • Sleep apnea with use of continuous positive airway pressure (CPAP)    • Type 2 diabetes mellitus (CMS/HCC)      diet controlled.    • Wears glasses        Past  Surgical History:    Past Surgical History:   Procedure Laterality Date   • AORTIC VALVE REPAIR/REPLACEMENT N/A 5/29/2020    Procedure: Mediansternotomy, AORTIC VALVE REPLACEMENT WITH Transesophageal echocardiogram;  Surgeon: Kumar Ramirez MD;  Location:  [facility];  Service: Cardiothoracic;  Laterality: N/A;   • APPENDECTOMY     • CARDIAC CATHETERIZATION N/A 5/5/2020    Procedure: Right and Left Heart Cath;  Surgeon: Lewis Khan MD;  Location:  [facility];  Service: Cardiology;  Laterality: N/A;   • CHOLECYSTECTOMY     • OTHER SURGICAL HISTORY      Lap-Band surgery in June 2015   • TONSILLECTOMY         Patient Active Problem List   Diagnosis   • Aortic stenosis with bicuspid valve status post mechanical AVR   • Class 3 severe obesity in adult (CMS/HCC)   • Tobacco abuse   • Type 2 diabetes mellitus without complication, without long-term current use of insulin (CMS/HCC)   • Palpitations   • Bilateral carotid bruits   • Essential hypertension   • Hyperlipidemia   • Sleep apnea with use of continuous positive airway pressure (CPAP)   • Secondary pulmonary arterial hypertension (CMS/HCC)   • Diastolic heart failure (CMS/HCC)   • Impaired functional mobility, balance, gait, and endurance   • Hx of aortic valve replacement, mechanical     Social History     Tobacco Use   • Smoking status: Current Every Day Smoker     Packs/day: 0.50     Years: 30.00     Pack years: 15.00     Types: Cigarettes   • Smokeless tobacco: Never Used   Substance Use Topics   • Alcohol use: No   • Drug use: No     Family History   Problem Relation Age of Onset   • Stroke Mother    • Heart attack Father 59   • Hypertension Sister    • Hyperlipidemia Sister        Medications:      Current Outpatient Medications:   •  albuterol sulfate HFA (PROAIR HFA) 108 (90 Base) MCG/ACT inhaler, 2 puffs q4h prn wheezing, Disp: , Rfl:   •  aspirin 81 MG tablet, Take 1 tablet by mouth Daily., Disp: 30 tablet, Rfl: 11  •  atorvastatin  "(LIPITOR) 40 MG tablet, Take 1 tablet by mouth Every Night., Disp: 30 tablet, Rfl: 3  •  furosemide (LASIX) 40 MG tablet, Take 40 mg by mouth Daily., Disp: , Rfl:   •  lisinopril (PRINIVIL,ZESTRIL) 2.5 MG tablet, Take 2.5 mg by mouth Daily., Disp: , Rfl:   •  metoprolol tartrate (LOPRESSOR) 25 MG tablet, Take 1 tablet by mouth Every 12 (Twelve) Hours., Disp: 60 tablet, Rfl: 0  •  potassium chloride (KLOR-CON) 10 MEQ CR tablet, Take 10 mEq by mouth Daily., Disp: , Rfl:   •  warfarin (Coumadin) 5 MG tablet, Take 1 tablet by mouth Every Night., Disp: 30 tablet, Rfl: 0  •  warfarin (Coumadin) 7.5 MG tablet, Take 1/2 tablet by mouth daily as directed, Disp: 15 tablet, Rfl: 0  •  HYDROcodone-acetaminophen (NORCO) 7.5-325 MG per tablet, Take 1 tablet by mouth Every 6 (Six) Hours As Needed for Moderate Pain  or Severe Pain ., Disp: 30 tablet, Rfl: 0  No current facility-administered medications for this visit.     Facility-Administered Medications Ordered in Other Visits:   •  Chlorhexidine Gluconate Cloth 2 % pads 1 application, 1 application, Topical, Q12H PRN, Xavi Oliveros PA    Allergies:  No Known Allergies    Physical Exam:  Vital Signs:    Vitals:    07/01/20 1241   BP: 116/71   BP Location: Right arm   Patient Position: Sitting   Pulse: 80   Temp: 98.1 °F (36.7 °C)   SpO2: 97%   Weight: (!) 139 kg (307 lb)   Height: 175.3 cm (69\")       Physical Exam   Gen- NAD, pleasant, cooperative  CV- Regular rate and rhythm, no murmur, gallop or rub  Pulm- Clear to auscultation bilateral without rhonchi, scattered wheezes noted  GI- Soft, normoactive bowel sounds, non-tender  Ext- Without edema  Incision- Well approximated midsternal incision with no erythema, drainage or dehiscence noted.  Neuro- CN II- XII grossly intact, tongue midline, voice normal.    Labs/Imaging:  Chest X-Ray, [facility], 7/1/20  Impression:  1. Postoperative changes for aortic valvuloplasty.  2. Left basilar atelectasis and small left " effusion  I personally reviewed these images in the office today.    Assessment / Plan:  Ms. Luisa Conteh is a pleasant 49 y.o. female with a history of hypertension, hyperlipidemia, diabetes mellitus, congestive heart failure, obesity, tobacco abuse and severe aortic valve stenosis status post aortic valve replacement with a 21 mm Saint Doug HP mechanical valve 5/29/2020 per Dr. Ramirez who returns the office today for postoperative exam.  I have discussed the results of the patient's chest x-ray with her in the office today.  She has a small remaining left pleural effusion.  I have encouraged her to aggressively use her incentive spirometer at least 10 times every hour while awake.  I feel this will clear on its own.  I feel like the patient would benefit from cardiac rehab to build back her we will set up this referral on her behalf.  She has an appointment to return to see cardiology, DAKSHA Nelson on 9/17/2020.  I have advised her that she may begin driving as long as she is not dizzy or is still taking pain medication.  I have outlined the physical activity suitable for each benchmark between 6 weeks, 3 months in 1 year.  At this time, I will plan to see her back so approximately 3 months with repeat chest x-ray.  Should she have any questions or concerns in the interval, she may contact the office.    Please note, this document was produced using voice recognition software.    DAKSHA Garrido  [facility] Cardiothoracic Surgery

## 2020-07-07 ENCOUNTER — ANTICOAGULATION VISIT (OUTPATIENT)
Dept: PHARMACY | Facility: HOSPITAL | Age: 50
End: 2020-07-07

## 2020-07-07 ENCOUNTER — DOCUMENTATION (OUTPATIENT)
Dept: CARDIAC REHAB | Facility: HOSPITAL | Age: 50
End: 2020-07-07

## 2020-07-07 ENCOUNTER — TELEPHONE (OUTPATIENT)
Dept: CARDIOLOGY | Facility: CLINIC | Age: 50
End: 2020-07-07

## 2020-07-07 DIAGNOSIS — Z95.2 HX OF AORTIC VALVE REPLACEMENT, MECHANICAL: ICD-10-CM

## 2020-07-07 LAB
INR PPP: 1.7 (ref 0.91–1.09)
PROTHROMBIN TIME: 20.2 SECONDS (ref 10–13.8)

## 2020-07-07 PROCEDURE — 85610 PROTHROMBIN TIME: CPT

## 2020-07-07 PROCEDURE — G0463 HOSPITAL OUTPT CLINIC VISIT: HCPCS | Performed by: PHARMACIST

## 2020-07-07 PROCEDURE — 36416 COLLJ CAPILLARY BLOOD SPEC: CPT

## 2020-07-07 NOTE — PROGRESS NOTES
Anticoagulation Clinic Progress Note  Indication: Aortic Mechanical Valve Replacement (Saint Doug Valve)    Referring Provider: Nelly Longoria  Initial Warfarin Start Date: 6/1/2020  Planned Duration of Therapy:indefinite   Goal INR: 2.5-3.5  Current Drug Interactions: aspirin - increase chance of bleeding      Patient home phone: [phone]    Anticoagulation Clinic INR History:  Date 6/1 6/2 6/3 6/5 6/8/20 6/11/20 6/15/20    Total Weekly Dose 5mg *1 dose 7.5mg  daily 7.5mg daily 27.5mg total (6/1-6/4) 45mg 37.5 mg 32.5mg    INR 1.21 1.24 1.25 2.6 2.8 4.5 POC/3.39  3.0      Date 6/18 6/22 6/29/20 7/7/20   Total Weekly Dose 30mg 31.25mg 31.25 mg 31.25mg   INR 2.7 2.7 2.6 1.7       Clinic Interview:  Tablet Strength: pt has 7.5mg and 5mg tablets   Current Dose: pt verified dose of 3.75mg MWF and 5mg ROW     Clinical Outcomes     Negatives:  Major bleeding event, Thromboembolic event, Anticoagulation-related hospital admission, Anticoagulation-related ED visit, Anticoagulation-related fatality   Comments:  May have missed a dose? She is unsure   Patient Findings     Positives:  Missed doses   Negatives:  Signs/symptoms of thrombosis, Signs/symptoms of bleeding, Laboratory test error suspected, Change in health, Change in alcohol use, Change in activity, Upcoming invasive procedure, Emergency department visit, Upcoming dental procedure, Extra doses, Change in medications, Change in diet/appetite, Hospital admission, Bruising, Other complaints   Comments:  May have missed a dose? She is unsure       Plan:  1. INR is 1.7. Instructed pt to take 7.5mg x 2 nights and Lovenox 140mg Q 12 H x 3 doses. (Pt states she has 150mg injections at home and understands how to use including pushing out 10mg prior to injecting)   2. RTC Thursday (Pt cannot come tomorrow for INR check)  3. Medications: No changes. Pt has Lovenox   4. Pt declined refills   5. Verbal and written information provided. Pt expresses understanding and has  no further questions at this time.  6. Pt wishes to switch to PCP for management after next visit.     Miranda Leyva, [facility]  07/07/20  13:43

## 2020-07-07 NOTE — PROGRESS NOTES
Cardiac Rehab staff mailed referral letter to patient regarding Phase II Cardiac Rehab program. Instruction for patient to contact [facility] Cardiac Rehab Department for additional program information and to forward referral to closest facility.

## 2020-07-09 ENCOUNTER — ANTICOAGULATION VISIT (OUTPATIENT)
Dept: PHARMACY | Facility: HOSPITAL | Age: 50
End: 2020-07-09

## 2020-07-09 DIAGNOSIS — Z95.2 HX OF AORTIC VALVE REPLACEMENT, MECHANICAL: ICD-10-CM

## 2020-07-09 PROCEDURE — 85610 PROTHROMBIN TIME: CPT

## 2020-07-09 PROCEDURE — G0463 HOSPITAL OUTPT CLINIC VISIT: HCPCS | Performed by: PHARMACIST

## 2020-07-09 PROCEDURE — 36416 COLLJ CAPILLARY BLOOD SPEC: CPT

## 2020-07-09 NOTE — PROGRESS NOTES
Anticoagulation Clinic Progress Note  Indication: Aortic Mechanical Valve Replacement (Saint Doug Valve)    Referring Provider: Nelly Longoria  Initial Warfarin Start Date: 6/1/2020  Planned Duration of Therapy:indefinite   Goal INR: 2.5-3.5  Current Drug Interactions: aspirin - increase chance of bleeding      Anticoagulation Clinic INR History:  Date 6/1 6/2 6/3 6/5 6/8/20 6/11/20 6/15/20    Total Weekly Dose 5mg *1 dose 7.5mg  daily 7.5mg daily 27.5mg total (6/1-6/4) 45mg 37.5 mg 32.5mg    INR 1.21 1.24 1.25 2.6 2.8 4.5 POC/3.39  3.0      Date 6/18 6/22 6/29/20 7/7/20 7/9/20   Total Weekly Dose 30mg 31.25mg 31.25 mg 31.25mg 37.5mg   INR 2.7 2.7 2.6 1.7 2.4       Clinic Interview:  Tablet Strength: pt has 7.5mg and 5mg tablets   Current Dose: pt verified dose of 7.5mg x 2 and Lovenox 140mg Q12H x 3 doses     Clinical Outcomes     Negatives:  Major bleeding event, Thromboembolic event, Anticoagulation-related hospital admission, Anticoagulation-related ED visit, Anticoagulation-related fatality   Comments:  May have missed a dose of Lovenox? She is unsure   Patient Findings     Positives:  Missed doses   Negatives:  Signs/symptoms of thrombosis, Signs/symptoms of bleeding, Laboratory test error suspected, Change in health, Change in alcohol use, Change in activity, Upcoming invasive procedure, Emergency department visit, Upcoming dental procedure, Extra doses, Change in medications, Change in diet/appetite, Hospital admission, Bruising, Other complaints   Comments:  May have missed a dose of Lovenox? She is unsure       Plan:  1. INR is 2.4. Instructed pt to take 7.5mg Friday and 5mg all other days and stop Lovenox.   2. RTC Monday with   3. Medications: No changes.   4. Pt declined refills   5. Verbal  information provided. Pt expresses understanding and has no further questions at this time.  6. Pt wishes to switch to PCP for management at next visit. Spoke with Xavi Mcneil' office to  confirm.    Miranda Leyva, [facility]  07/09/20  14:42

## 2020-07-13 ENCOUNTER — TELEPHONE (OUTPATIENT)
Dept: PHARMACY | Facility: HOSPITAL | Age: 50
End: 2020-07-13

## 2020-07-13 ENCOUNTER — DOCUMENTATION (OUTPATIENT)
Dept: CARDIAC REHAB | Facility: HOSPITAL | Age: 50
End: 2020-07-13

## 2020-07-13 LAB
INR PPP: 2.4 (ref 0.91–1.09)
PROTHROMBIN TIME: 28.8 SECONDS (ref 10–13.8)

## 2020-07-13 NOTE — PROGRESS NOTES
Pt. Referred for Phase II Cardiac Rehab. Staff discussed benefits of exercise, program protocol, and educational material provided. Teach back verified.  Permission granted from patient for staff to fax referral information to outlying program at this time.  Staff faxed referral info to [facility].

## 2020-07-21 ENCOUNTER — APPOINTMENT (OUTPATIENT)
Dept: PHARMACY | Facility: HOSPITAL | Age: 50
End: 2020-07-21

## 2020-08-13 RX ORDER — WARFARIN SODIUM 5 MG/1
5 TABLET ORAL NIGHTLY
Qty: 30 TABLET | Refills: 0 | OUTPATIENT
Start: 2020-08-13

## 2020-08-20 RX ORDER — WARFARIN SODIUM 5 MG/1
5 TABLET ORAL NIGHTLY
Qty: 30 TABLET | Refills: 0 | OUTPATIENT
Start: 2020-08-20

## 2020-09-17 ENCOUNTER — OFFICE VISIT (OUTPATIENT)
Dept: CARDIOLOGY | Facility: CLINIC | Age: 50
End: 2020-09-17

## 2020-09-17 VITALS
HEIGHT: 69 IN | HEART RATE: 72 BPM | TEMPERATURE: 98.1 F | SYSTOLIC BLOOD PRESSURE: 130 MMHG | WEIGHT: 293 LBS | RESPIRATION RATE: 16 BRPM | BODY MASS INDEX: 43.4 KG/M2 | DIASTOLIC BLOOD PRESSURE: 77 MMHG

## 2020-09-17 DIAGNOSIS — Z72.0 TOBACCO ABUSE: ICD-10-CM

## 2020-09-17 DIAGNOSIS — Q23.1 AORTIC STENOSIS WITH BICUSPID VALVE: Primary | ICD-10-CM

## 2020-09-17 DIAGNOSIS — Z95.2 HX OF AORTIC VALVE REPLACEMENT, MECHANICAL: ICD-10-CM

## 2020-09-17 DIAGNOSIS — I10 ESSENTIAL HYPERTENSION: ICD-10-CM

## 2020-09-17 DIAGNOSIS — Q23.0 AORTIC STENOSIS WITH BICUSPID VALVE: Primary | ICD-10-CM

## 2020-09-17 DIAGNOSIS — E11.9 TYPE 2 DIABETES MELLITUS WITHOUT COMPLICATION, WITHOUT LONG-TERM CURRENT USE OF INSULIN (HCC): ICD-10-CM

## 2020-09-17 DIAGNOSIS — G47.30 SLEEP APNEA WITH USE OF CONTINUOUS POSITIVE AIRWAY PRESSURE (CPAP): ICD-10-CM

## 2020-09-17 DIAGNOSIS — E78.49 OTHER HYPERLIPIDEMIA: ICD-10-CM

## 2020-09-17 PROCEDURE — 99213 OFFICE O/P EST LOW 20 MIN: CPT | Performed by: NURSE PRACTITIONER

## 2020-09-17 RX ORDER — ESCITALOPRAM OXALATE 10 MG/1
10 TABLET ORAL DAILY
COMMUNITY

## 2020-09-17 NOTE — PROGRESS NOTES
Kreis, Samuel Duane, MD  Luisa Conteh  1970 09/17/2020    Patient Active Problem List   Diagnosis   • Aortic stenosis with bicuspid valve status post mechanical AVR   • Class 3 severe obesity in adult (CMS/[facility])   • Tobacco abuse   • Type 2 diabetes mellitus without complication, without long-term current use of insulin (CMS/HCC)   • Palpitations   • Bilateral carotid bruits   • Essential hypertension   • Hyperlipidemia   • Sleep apnea with use of continuous positive airway pressure (CPAP)   • Secondary pulmonary arterial hypertension (CMS/HCC)   • Diastolic heart failure (CMS/HCC)   • Impaired functional mobility, balance, gait, and endurance   • Hx of aortic valve replacement, mechanical       Dear Kreis, Samuel Duane, MD:    Subjective     Chief Complaint   Patient presents with   • Aortic Stenosis     s/p [facility] valve 05/2020   • Shortness of Breath     unchanged   • Med Management     list provided           History of Present Illness:    Luisa Conteh is a 49 y.o. female with a history of diastolic heart failure associated with severe aortic stenosis with a bicuspid aortic valve, history of diabetes mellitus type 2, hyperlipidemia, tobacco abuse.  She presents today for routine cardiology follow-up.  She underwent aortic valve replacement with a 21 mm Saint Doug HP mechanical valve on 5/29/2020.  She has been anticoagulated with warfarin which is now being managed by her PCP.  She denies any bleeding issues.  She denies any chest pains, palpitations, dizziness, or lightheadedness.  She reports her breathing has markedly improved.  She has had a 17 pound weight gain since her last visit 3 months ago.  However, she denies any orthopnea, PND, or edema.  She believes this is due to her diet.          No Known Allergies:      Current Outpatient Medications:   •  atorvastatin (LIPITOR) 40 MG tablet, Take 1 tablet by mouth Every Night., Disp: 30 tablet, Rfl: 3  •  escitalopram (LEXAPRO) 10 MG tablet,  Take 10 mg by mouth Daily., Disp: , Rfl:   •  furosemide (LASIX) 40 MG tablet, Take 20 mg by mouth Daily., Disp: , Rfl:   •  lisinopril (PRINIVIL,ZESTRIL) 2.5 MG tablet, Take 2.5 mg by mouth Daily., Disp: , Rfl:   •  metoprolol tartrate (LOPRESSOR) 25 MG tablet, Take 1 tablet by mouth Every 12 (Twelve) Hours., Disp: 60 tablet, Rfl: 0  •  potassium chloride (KLOR-CON) 10 MEQ CR tablet, Take 10 mEq by mouth Daily., Disp: , Rfl:   •  warfarin (Coumadin) 7.5 MG tablet, Take 1/2 tablet by mouth daily as directed, Disp: 15 tablet, Rfl: 0  •  albuterol sulfate HFA (PROAIR HFA) 108 (90 Base) MCG/ACT inhaler, 2 puffs q4h prn wheezing, Disp: , Rfl:   •  HYDROcodone-acetaminophen (NORCO) 7.5-325 MG per tablet, Take 1 tablet by mouth Every 6 (Six) Hours As Needed for Moderate Pain  or Severe Pain ., Disp: 30 tablet, Rfl: 0  •  warfarin (Coumadin) 5 MG tablet, Take 1 tablet by mouth Every Night., Disp: 30 tablet, Rfl: 0  No current facility-administered medications for this visit.     Facility-Administered Medications Ordered in Other Visits:   •  Chlorhexidine Gluconate Cloth 2 % pads 1 application, 1 application, Topical, Q12H PRN, Xavi Oliveros, PA      The following portions of the patient's history were reviewed and updated as appropriate: allergies, current medications, past family history, past medical history, past social history, past surgical history and problem list.    Social History     Tobacco Use   • Smoking status: Current Every Day Smoker     Packs/day: 0.50     Years: 30.00     Pack years: 15.00     Types: Cigarettes   • Smokeless tobacco: Never Used   Substance Use Topics   • Alcohol use: No   • Drug use: No       Review of Systems   Constitution: Negative for decreased appetite and malaise/fatigue.   Cardiovascular: Negative for chest pain, dyspnea on exertion, irregular heartbeat, leg swelling, near-syncope, orthopnea, palpitations, paroxysmal nocturnal dyspnea and syncope.   Respiratory: Positive for  "shortness of breath. Negative for cough and wheezing.    Neurological: Negative for dizziness, light-headedness and weakness.       Objective   Vitals:    09/17/20 1118   BP: 130/77   Pulse: 72   Resp: 16   Temp: 98.1 °F (36.7 °C)   Weight: (!) 145 kg (319 lb 6.4 oz)   Height: 175.3 cm (69\")     Body mass index is 47.17 kg/m².        Constitutional:       Appearance: Healthy appearance. Well-developed and not in distress.   HENT:      Head: Normocephalic and atraumatic.   Neck:      Vascular: No JVD.   Pulmonary:      Effort: Pulmonary effort is normal.      Breath sounds: Normal breath sounds. No wheezing. No rhonchi. No rales.   Chest:      Chest wall: Not tender to palpatation.   Cardiovascular:      Normal rate. Regular rhythm.      Murmurs:  Mechanical valve click noted      . No S3 and S4 gallop.   Edema:     Peripheral edema absent.   Abdominal:      General: Bowel sounds are normal.      Palpations: Abdomen is soft.   Musculoskeletal: Normal range of motion.         General: No tenderness.   Skin:     General: Skin is warm and dry.   Neurological:      General: No focal deficit present.      Mental Status: Alert, oriented to person, place, and time and oriented to person, place and time.   Psychiatric:         Mood and Affect: Mood normal.         Behavior: Behavior normal.         Lab Results   Component Value Date     (L) 06/02/2020    K 4.0 06/02/2020    CL 97 (L) 06/02/2020    CO2 26.0 06/02/2020    BUN 28 (H) 06/02/2020    CREATININE 0.80 06/02/2020    GLUCOSE 102 (H) 06/02/2020    CALCIUM 8.9 06/02/2020    AST 15 05/26/2020    ALT 11 05/26/2020    ALKPHOS 91 05/26/2020    LABIL2 1.2 (L) 11/05/2014     No results found for: CKTOTAL  Lab Results   Component Value Date    WBC 14.52 (H) 06/01/2020    HGB 11.5 (L) 06/01/2020    HCT 38.8 06/01/2020     (L) 06/01/2020     Lab Results   Component Value Date    INR 2.4 (H) 07/09/2020    INR 1.7 (H) 07/07/2020    INR 2.6 (H) 06/29/2020     Lab " Results   Component Value Date    MG 2.8 (H) 05/29/2020     Lab Results   Component Value Date    TRIG 69 05/29/2020    HDL 28 (L) 05/29/2020    LDL 69 05/29/2020      No results found for: BNP        Procedures      Assessment/Plan    Diagnosis Plan   1. Aortic stenosis with bicuspid valve status post mechanical AVR     2. Essential hypertension     3. Other hyperlipidemia     4. Sleep apnea with use of continuous positive airway pressure (CPAP)     5. Type 2 diabetes mellitus without complication, without long-term current use of insulin (CMS/[facility])     6. Hx of aortic valve replacement, mechanical     7. Tobacco abuse                  Recommendations:    Continue with warfarin, metoprolol, lisinopril, and furosemide.    She will continue routine follow up with CT surgery.    Follow-up in 3 months or sooner if needed.         Return in about 3 months (around 12/17/2020) for Recheck.    As always, I appreciate very much the opportunity to participate in the cardiovascular care of your patients.      With Best Regards,    DAKSHA Nelson

## 2020-10-29 DIAGNOSIS — J90 RECURRENT LEFT PLEURAL EFFUSION: Primary | ICD-10-CM

## 2020-12-02 ENCOUNTER — TELEPHONE (OUTPATIENT)
Dept: CARDIAC SURGERY | Facility: CLINIC | Age: 50
End: 2020-12-02

## 2021-01-05 ENCOUNTER — TELEPHONE (OUTPATIENT)
Dept: CARDIAC SURGERY | Facility: CLINIC | Age: 51
End: 2021-01-05

## 2021-01-07 ENCOUNTER — TELEPHONE (OUTPATIENT)
Dept: CARDIAC SURGERY | Facility: CLINIC | Age: 51
End: 2021-01-07

## 2021-02-09 ENCOUNTER — TELEPHONE (OUTPATIENT)
Dept: CARDIAC SURGERY | Facility: CLINIC | Age: 51
End: 2021-02-09

## 2021-06-29 ENCOUNTER — HOSPITAL ENCOUNTER (OUTPATIENT)
Dept: HOSPITAL 79 - MAMO | Age: 51
End: 2021-06-29
Attending: NURSE PRACTITIONER
Payer: MEDICARE

## 2021-06-29 DIAGNOSIS — Z12.31: Primary | ICD-10-CM

## 2022-06-23 ENCOUNTER — HOSPITAL ENCOUNTER (OUTPATIENT)
Dept: HOSPITAL 79 - KOH-I | Age: 52
End: 2022-06-23
Attending: FAMILY MEDICINE
Payer: MEDICARE

## 2022-06-23 DIAGNOSIS — R91.8: ICD-10-CM

## 2022-06-23 DIAGNOSIS — F17.210: Primary | ICD-10-CM

## 2024-04-16 ENCOUNTER — OFFICE VISIT (OUTPATIENT)
Age: 54
End: 2024-04-16
Payer: MEDICARE

## 2024-04-16 VITALS
WEIGHT: 293 LBS | OXYGEN SATURATION: 97 % | SYSTOLIC BLOOD PRESSURE: 158 MMHG | HEART RATE: 91 BPM | BODY MASS INDEX: 43.4 KG/M2 | HEIGHT: 69 IN | DIASTOLIC BLOOD PRESSURE: 79 MMHG

## 2024-04-16 DIAGNOSIS — Q23.0 AORTIC STENOSIS DUE TO BICUSPID AORTIC VALVE: Primary | ICD-10-CM

## 2024-04-16 DIAGNOSIS — Q23.1 AORTIC STENOSIS DUE TO BICUSPID AORTIC VALVE: Primary | ICD-10-CM

## 2024-04-16 DIAGNOSIS — I10 ESSENTIAL HYPERTENSION: ICD-10-CM

## 2024-04-16 DIAGNOSIS — R06.09 DYSPNEA ON EXERTION: ICD-10-CM

## 2024-04-16 PROCEDURE — 3077F SYST BP >= 140 MM HG: CPT | Performed by: INTERNAL MEDICINE

## 2024-04-16 PROCEDURE — 3078F DIAST BP <80 MM HG: CPT | Performed by: INTERNAL MEDICINE

## 2024-04-16 PROCEDURE — 93000 ELECTROCARDIOGRAM COMPLETE: CPT | Performed by: INTERNAL MEDICINE

## 2024-04-16 PROCEDURE — 99204 OFFICE O/P NEW MOD 45 MIN: CPT | Performed by: INTERNAL MEDICINE

## 2024-04-16 RX ORDER — ATORVASTATIN CALCIUM 10 MG/1
10 TABLET, FILM COATED ORAL DAILY
COMMUNITY

## 2024-04-16 RX ORDER — DULOXETIN HYDROCHLORIDE 60 MG/1
60 CAPSULE, DELAYED RELEASE ORAL DAILY
COMMUNITY

## 2024-04-16 NOTE — PROGRESS NOTES
Kreis, Samuel Duane, MD  Luisa Conteh  1970 04/16/2024    Patient Active Problem List   Diagnosis    Aortic stenosis with bicuspid valve status post mechanical AVR    Class 3 severe obesity in adult    Tobacco abuse    Type 2 diabetes mellitus without complication, without long-term current use of insulin    Palpitations    Bilateral carotid bruits    Essential hypertension    Hyperlipidemia    Sleep apnea with use of continuous positive airway pressure (CPAP)    Secondary pulmonary arterial hypertension    Diastolic heart failure    Impaired functional mobility, balance, gait, and endurance    Hx of aortic valve replacement, mechanical       Dear Kreis, Samuel Duane, MD:    Subjective     Luisa Conteh is a 53 y.o. female with the problems as listed above, presents    Chief complaint: To establish cardiac care and follow-up in a patient with history of aortic valve replacement in May 2020.    History of Present Illness: Ms. Luisa Galindo is a pleasant 53-year-old  female with history of bicuspid aortic valve with severe aortic stenosis that was noted back in 2020 and for which she underwent aortic valve replacement with a 21 mm Saint Doug's mechanical valve.  She is here to establish cardiac care and follow-up to our office.      On further questioning she complains of dyspnea with mild exertion such as walking about 50 yards.  This apparently has been getting progressively worse over the last few years.  She has a long history of smoking since age 15 and has been smoking about a pack and half a day.  She denies any chest pains but has some shoulder pains which have been chronic.  Her coronary angiography was normal back in 2020 prior to the aortic valve replacement.    No Known Allergies:    Current Outpatient Medications:     atorvastatin (LIPITOR) 10 MG tablet, Take 1 tablet by mouth Daily., Disp: , Rfl:     DULoxetine (CYMBALTA) 60 MG capsule, Take 1 capsule by mouth Daily.,  Disp: , Rfl:     furosemide (LASIX) 40 MG tablet, Take 0.5 tablets by mouth Daily., Disp: , Rfl:     metoprolol tartrate (LOPRESSOR) 25 MG tablet, Take 1 tablet by mouth Every 12 (Twelve) Hours., Disp: 60 tablet, Rfl: 0    potassium chloride (KLOR-CON) 10 MEQ CR tablet, Take 1 tablet by mouth Daily., Disp: , Rfl:     warfarin (Coumadin) 5 MG tablet, Take 1 tablet by mouth Every Night., Disp: 30 tablet, Rfl: 0    warfarin (Coumadin) 7.5 MG tablet, Take 1/2 tablet by mouth daily as directed, Disp: 15 tablet, Rfl: 0  No current facility-administered medications for this visit.    Facility-Administered Medications Ordered in Other Visits:     Chlorhexidine Gluconate Cloth 2 % pads 1 application, 1 application , Topical, Q12H PRN, Xavi Oliveros PA    Past Medical History:   Diagnosis Date    Aortic valve stenosis     CHF (congestive heart failure)     Heart murmur     Hyperlipidemia     Hypertension     Low back pain     Neuropathy     Obesity     Pulmonary hypertension     Sleep apnea with use of continuous positive airway pressure (CPAP)     Type 2 diabetes mellitus      diet controlled.     Wears glasses      Past Surgical History:   Procedure Laterality Date    AORTIC VALVE REPAIR/REPLACEMENT N/A 5/29/2020    Procedure: Mediansternotomy, AORTIC VALVE REPLACEMENT WITH Transesophageal echocardiogram;  Surgeon: Kumar Ramirez MD;  Location: [facility] OR;  Service: Cardiothoracic;  Laterality: N/A;    AORTIC VALVE REPAIR/REPLACEMENT  05/2020    mechanical valve    APPENDECTOMY      CARDIAC CATHETERIZATION N/A 5/5/2020    Procedure: Right and Left Heart Cath;  Surgeon: Lewis Khan MD;  Location:  [facility] CATH INVASIVE LOCATION;  Service: Cardiology;  Laterality: N/A;    CHOLECYSTECTOMY      OTHER SURGICAL HISTORY      Lap-Band surgery in June 2015    TONSILLECTOMY       Family History   Problem Relation Age of Onset    Stroke Mother     Heart attack Father 59    Hypertension Sister     Hyperlipidemia Sister      Social  "History     Tobacco Use    Smoking status: Every Day     Current packs/day: 0.50     Average packs/day: 0.5 packs/day for 30.0 years (15.0 ttl pk-yrs)     Types: Cigarettes    Smokeless tobacco: Never   Vaping Use    Vaping status: Never Used   Substance Use Topics    Alcohol use: No    Drug use: No     Review of Systems   Constitutional: Positive for malaise/fatigue. Negative for chills, fever, weight gain and weight loss.   HENT:  Negative for congestion and nosebleeds.    Cardiovascular:  Negative for chest pain, irregular heartbeat, leg swelling and orthopnea.   Respiratory:  Positive for shortness of breath. Negative for cough and hemoptysis.    Endocrine: Positive for cold intolerance and heat intolerance.   Musculoskeletal:  Positive for back pain and joint pain.   Gastrointestinal:  Negative for abdominal pain, change in bowel habit, hematemesis, melena and vomiting.   Genitourinary:  Negative for dysuria, frequency and hematuria.   Neurological:  Negative for focal weakness, headaches, light-headedness and weakness.   Psychiatric/Behavioral:  The patient has insomnia and is nervous/anxious.      Objective   Blood pressure 158/79, pulse 91, height 175.3 cm (69\"), weight (!) 152 kg (336 lb), SpO2 97%, not currently breastfeeding.  Body mass index is 49.62 kg/m².    Constitutional:       Appearance: Well-developed.   Eyes:      Conjunctiva/sclera: Conjunctivae normal.   HENT:      Head: Normocephalic.   Neck:      Thyroid: No thyromegaly.      Vascular: No JVD.      Trachea: No tracheal deviation.   Pulmonary:      Effort: No respiratory distress.      Breath sounds: Normal breath sounds. No wheezing. No rales.   Cardiovascular:      PMI at left midclavicular line. Normal rate. Regular rhythm. Normal S1. Normal S2. Metallic click of the second heart sound noted.       Murmurs: There is no murmur.      No gallop.  No click. No rub.   Pulses:     Intact distal pulses.   Edema:     Peripheral edema absent. " "  Abdominal:      General: Bowel sounds are normal.      Palpations: Abdomen is soft. There is no abdominal mass.      Tenderness: There is no abdominal tenderness.   Musculoskeletal:      Cervical back: Normal range of motion and neck supple. Skin:     General: Skin is warm and dry.   Neurological:      Mental Status: Alert and oriented to person, place, and time.      Cranial Nerves: No cranial nerve deficit.       Lab Results   Component Value Date     (L) 06/02/2020    K 4.0 06/02/2020    CL 97 (L) 06/02/2020    CO2 26.0 06/02/2020    BUN 28 (H) 06/02/2020    CREATININE 0.80 06/02/2020    GLUCOSE 102 (H) 06/02/2020    CALCIUM 8.9 06/02/2020    AST 15 05/26/2020    ALT 11 05/26/2020    ALKPHOS 91 05/26/2020    LABIL2 1.2 (L) 11/05/2014     No results found for: \"CKTOTAL\"  Lab Results   Component Value Date    WBC 14.52 (H) 06/01/2020    HGB 11.5 (L) 06/01/2020    HCT 38.8 06/01/2020     (L) 06/01/2020     Lab Results   Component Value Date    INR 2.4 (H) 07/09/2020    INR 1.7 (H) 07/07/2020    INR 2.6 (H) 06/29/2020     Lab Results   Component Value Date    MG 2.8 (H) 05/29/2020     Lab Results   Component Value Date    TRIG 69 05/29/2020    HDL 28 (L) 05/29/2020    LDL 69 05/29/2020          ECG 12 Lead    Date/Time: 4/16/2024 3:02 PM  Performed by: Lewis Khan MD    Authorized by: Lewis Khan MD  Comparison: compared with previous ECG from 5/31/2020  Comparison to previous ECG: Patient had T wave inversion in the precordial leads on the previous EKG which is not evident on this EKG.  Rhythm: sinus rhythm  Conduction: conduction normal  ST Segments: ST segments normal  T Waves: T waves normal          Assessment & Plan    Diagnosis Plan   1. Aortic stenosis due to bicuspid aortic valve, s/p aortic valve replacement with 21 mm Saint Doug's valve in May 2020.  Adult Transthoracic Echo Complete w/ Color, Spectral and Contrast if necessary per protocol      2. Dyspnea on exertion (NYHA class " III)  Adult Transthoracic Echo Complete w/ Color, Spectral and Contrast if necessary per protocol      3. Essential hypertension            Recommendations  Orders Placed This Encounter   Procedures    ECG 12 Lead    Adult Transthoracic Echo Complete w/ Color, Spectral and Contrast if necessary per protocol        Will evaluate her dyspnea further with an echo Doppler study.  Continue with warfarin to keep the INR between 2.5 and 3.  I have asked her to keep a check on the blood pressure twice a day and follow-up with Dr. Bonner.    Return in about 7 weeks (around 6/4/2024).    As always, ,  I appreciate very much the opportunity to participate in the cardiovascular care of your patients. Please do not hesitate to call me with any questions with regards to Luisa Conteh's evaluation and management.     With Best Regards,        Lewis Khan MD, [facility]    Please note that portions of this note were completed with a voice recognition program.

## 2024-04-16 NOTE — LETTER
April 19, 2024     Samuel Duane Kreis, MD  [address] [place] [address] [address] [place] [address]    Patient: Luisa Conteh   YOB: 1970   Date of Visit: 4/16/2024     Dear Dr. Mcneil:       Thank you for referring Luisa Conteh to me for evaluation. Below are the relevant portions of my assessment and plan of care.    If you have questions, please do not hesitate to call me. I look forward to following Luisa along with you.         Sincerely,        Lewis Khan MD        CC: No Recipients    Lewis Khan MD  04/19/24 0952  Sign when Signing Visit  Kreis, Samuel Duane, MD  Luisa Conteh  1970 04/16/2024    Patient Active Problem List   Diagnosis   • Aortic stenosis with bicuspid valve status post mechanical AVR   • Class 3 severe obesity in adult   • Tobacco abuse   • Type 2 diabetes mellitus without complication, without long-term current use of insulin   • Palpitations   • Bilateral carotid bruits   • Essential hypertension   • Hyperlipidemia   • Sleep apnea with use of continuous positive airway pressure (CPAP)   • Secondary pulmonary arterial hypertension   • Diastolic heart failure   • Impaired functional mobility, balance, gait, and endurance   • Hx of aortic valve replacement, mechanical       Dear Kreis, Samuel Duane, MD:    Subjective     Luisa Conteh is a 53 y.o. female with the problems as listed above, presents    Chief complaint: To establish cardiac care and follow-up in a patient with history of aortic valve replacement in May 2020.    History of Present Illness: Ms. Luisa Galindo is a pleasant 53-year-old  female with history of bicuspid aortic valve with severe aortic stenosis that was noted back in 2020 and for which she underwent aortic valve replacement with a 21 mm Saint Doug's mechanical valve.  She is here to establish cardiac care and follow-up to our office.      On further questioning she complains of dyspnea with mild exertion such as  walking about 50 yards.  This apparently has been getting progressively worse over the last few years.  She has a long history of smoking since age 15 and has been smoking about a pack and half a day.  She denies any chest pains but has some shoulder pains which have been chronic.  Her coronary angiography was normal back in 2020 prior to the aortic valve replacement.    No Known Allergies:    Current Outpatient Medications:   •  atorvastatin (LIPITOR) 10 MG tablet, Take 1 tablet by mouth Daily., Disp: , Rfl:   •  DULoxetine (CYMBALTA) 60 MG capsule, Take 1 capsule by mouth Daily., Disp: , Rfl:   •  furosemide (LASIX) 40 MG tablet, Take 0.5 tablets by mouth Daily., Disp: , Rfl:   •  metoprolol tartrate (LOPRESSOR) 25 MG tablet, Take 1 tablet by mouth Every 12 (Twelve) Hours., Disp: 60 tablet, Rfl: 0  •  potassium chloride (KLOR-CON) 10 MEQ CR tablet, Take 1 tablet by mouth Daily., Disp: , Rfl:   •  warfarin (Coumadin) 5 MG tablet, Take 1 tablet by mouth Every Night., Disp: 30 tablet, Rfl: 0  •  warfarin (Coumadin) 7.5 MG tablet, Take 1/2 tablet by mouth daily as directed, Disp: 15 tablet, Rfl: 0  No current facility-administered medications for this visit.    Facility-Administered Medications Ordered in Other Visits:   •  Chlorhexidine Gluconate Cloth 2 % pads 1 application, 1 application , Topical, Q12H PRN, Xavi Oliveros, PA    Past Medical History:   Diagnosis Date   • Aortic valve stenosis    • CHF (congestive heart failure)    • Heart murmur    • Hyperlipidemia    • Hypertension    • Low back pain    • Neuropathy    • Obesity    • Pulmonary hypertension    • Sleep apnea with use of continuous positive airway pressure (CPAP)    • Type 2 diabetes mellitus      diet controlled.    • Wears glasses      Past Surgical History:   Procedure Laterality Date   • AORTIC VALVE REPAIR/REPLACEMENT N/A 5/29/2020    Procedure: Mediansternotomy, AORTIC VALVE REPLACEMENT WITH Transesophageal echocardiogram;  Surgeon: Ashley  "Kumar GILL MD;  Location:  [facility];  Service: Cardiothoracic;  Laterality: N/A;   • AORTIC VALVE REPAIR/REPLACEMENT  05/2020    mechanical valve   • APPENDECTOMY     • CARDIAC CATHETERIZATION N/A 5/5/2020    Procedure: Right and Left Heart Cath;  Surgeon: Lewis Khan MD;  Location:  [facility];  Service: Cardiology;  Laterality: N/A;   • CHOLECYSTECTOMY     • OTHER SURGICAL HISTORY      Lap-Band surgery in June 2015   • TONSILLECTOMY       Family History   Problem Relation Age of Onset   • Stroke Mother    • Heart attack Father 59   • Hypertension Sister    • Hyperlipidemia Sister      Social History     Tobacco Use   • Smoking status: Every Day     Current packs/day: 0.50     Average packs/day: 0.5 packs/day for 30.0 years (15.0 ttl pk-yrs)     Types: Cigarettes   • Smokeless tobacco: Never   Vaping Use   • Vaping status: Never Used   Substance Use Topics   • Alcohol use: No   • Drug use: No     Review of Systems   Constitutional: Positive for malaise/fatigue. Negative for chills, fever, weight gain and weight loss.   HENT:  Negative for congestion and nosebleeds.    Cardiovascular:  Negative for chest pain, irregular heartbeat, leg swelling and orthopnea.   Respiratory:  Positive for shortness of breath. Negative for cough and hemoptysis.    Endocrine: Positive for cold intolerance and heat intolerance.   Musculoskeletal:  Positive for back pain and joint pain.   Gastrointestinal:  Negative for abdominal pain, change in bowel habit, hematemesis, melena and vomiting.   Genitourinary:  Negative for dysuria, frequency and hematuria.   Neurological:  Negative for focal weakness, headaches, light-headedness and weakness.   Psychiatric/Behavioral:  The patient has insomnia and is nervous/anxious.      Objective   Blood pressure 158/79, pulse 91, height 175.3 cm (69\"), weight (!) 152 kg (336 lb), SpO2 97%, not currently breastfeeding.  Body mass index is 49.62 kg/m².    Constitutional:       " "Appearance: Well-developed.   Eyes:      Conjunctiva/sclera: Conjunctivae normal.   HENT:      Head: Normocephalic.   Neck:      Thyroid: No thyromegaly.      Vascular: No JVD.      Trachea: No tracheal deviation.   Pulmonary:      Effort: No respiratory distress.      Breath sounds: Normal breath sounds. No wheezing. No rales.   Cardiovascular:      PMI at left midclavicular line. Normal rate. Regular rhythm. Normal S1. Normal S2. Metallic click of the second heart sound noted.       Murmurs: There is no murmur.      No gallop.  No click. No rub.   Pulses:     Intact distal pulses.   Edema:     Peripheral edema absent.   Abdominal:      General: Bowel sounds are normal.      Palpations: Abdomen is soft. There is no abdominal mass.      Tenderness: There is no abdominal tenderness.   Musculoskeletal:      Cervical back: Normal range of motion and neck supple. Skin:     General: Skin is warm and dry.   Neurological:      Mental Status: Alert and oriented to person, place, and time.      Cranial Nerves: No cranial nerve deficit.       Lab Results   Component Value Date     (L) 06/02/2020    K 4.0 06/02/2020    CL 97 (L) 06/02/2020    CO2 26.0 06/02/2020    BUN 28 (H) 06/02/2020    CREATININE 0.80 06/02/2020    GLUCOSE 102 (H) 06/02/2020    CALCIUM 8.9 06/02/2020    AST 15 05/26/2020    ALT 11 05/26/2020    ALKPHOS 91 05/26/2020    LABIL2 1.2 (L) 11/05/2014     No results found for: \"CKTOTAL\"  Lab Results   Component Value Date    WBC 14.52 (H) 06/01/2020    HGB 11.5 (L) 06/01/2020    HCT 38.8 06/01/2020     (L) 06/01/2020     Lab Results   Component Value Date    INR 2.4 (H) 07/09/2020    INR 1.7 (H) 07/07/2020    INR 2.6 (H) 06/29/2020     Lab Results   Component Value Date    MG 2.8 (H) 05/29/2020     Lab Results   Component Value Date    TRIG 69 05/29/2020    HDL 28 (L) 05/29/2020    LDL 69 05/29/2020          ECG 12 Lead    Date/Time: 4/16/2024 3:02 PM  Performed by: Lewis Khan MD    Authorized " by: Lewis Khan MD  Comparison: compared with previous ECG from 5/31/2020  Comparison to previous ECG: Patient had T wave inversion in the precordial leads on the previous EKG which is not evident on this EKG.  Rhythm: sinus rhythm  Conduction: conduction normal  ST Segments: ST segments normal  T Waves: T waves normal          Assessment & Plan    Diagnosis Plan   1. Aortic stenosis due to bicuspid aortic valve, s/p aortic valve replacement with 21 mm Saint Doug's valve in May 2020.  Adult Transthoracic Echo Complete w/ Color, Spectral and Contrast if necessary per protocol      2. Dyspnea on exertion (NYHA class III)  Adult Transthoracic Echo Complete w/ Color, Spectral and Contrast if necessary per protocol      3. Essential hypertension            Recommendations  Orders Placed This Encounter   Procedures   • ECG 12 Lead   • Adult Transthoracic Echo Complete w/ Color, Spectral and Contrast if necessary per protocol        Will evaluate her dyspnea further with an echo Doppler study.  Continue with warfarin to keep the INR between 2.5 and 3.  I have asked her to keep a check on the blood pressure twice a day and follow-up with Dr. Bonner.    Return in about 7 weeks (around 6/4/2024).    As always, ,  I appreciate very much the opportunity to participate in the cardiovascular care of your patients. Please do not hesitate to call me with any questions with regards to Luisa Conteh's evaluation and management.     With Best Regards,        Lewis Khan MD, [facility]    Please note that portions of this note were completed with a voice recognition program.

## 2024-05-09 ENCOUNTER — HOSPITAL ENCOUNTER (OUTPATIENT)
Dept: CARDIOLOGY | Facility: HOSPITAL | Age: 54
Discharge: HOME | End: 2024-05-09
Payer: MEDICARE

## 2024-05-09 DIAGNOSIS — Q23.1 AORTIC STENOSIS DUE TO BICUSPID AORTIC VALVE: ICD-10-CM

## 2024-05-09 DIAGNOSIS — Q23.0 AORTIC STENOSIS DUE TO BICUSPID AORTIC VALVE: ICD-10-CM

## 2024-05-09 DIAGNOSIS — R06.09 DYSPNEA ON EXERTION: ICD-10-CM

## 2024-05-09 LAB
BH CV ECHO MEAS - AO ROOT DIAM: 3.1 CM
BH CV ECHO MEAS - EDV(MOD-SP4): 46.4 ML
BH CV ECHO MEAS - EF(MOD-SP4): 56.5 %
BH CV ECHO MEAS - ESV(MOD-SP4): 20.2 ML
BH CV ECHO MEAS - LA DIMENSION: 4 CM
BH CV ECHO MEAS - LAT PEAK E' VEL: 7 CM/SEC
BH CV ECHO MEAS - LV DIASTOLIC VOL/BSA (35-75): 18 CM2
BH CV ECHO MEAS - LV MAX PG: 4.7 MMHG
BH CV ECHO MEAS - LV MEAN PG: 3 MMHG
BH CV ECHO MEAS - LV SYSTOLIC VOL/BSA (12-30): 7.8 CM2
BH CV ECHO MEAS - LV V1 MAX: 108 CM/SEC
BH CV ECHO MEAS - LV V1 VTI: 26.6 CM
BH CV ECHO MEAS - LVOT AREA: 4.5 CM2
BH CV ECHO MEAS - LVOT DIAM: 2.4 CM
BH CV ECHO MEAS - MED PEAK E' VEL: 6.2 CM/SEC
BH CV ECHO MEAS - MV A MAX VEL: 110 CM/SEC
BH CV ECHO MEAS - MV E MAX VEL: 154 CM/SEC
BH CV ECHO MEAS - MV E/A: 1.4
BH CV ECHO MEAS - PA ACC TIME: 0.06 SEC
BH CV ECHO MEAS - RAP SYSTOLE: 10 MMHG
BH CV ECHO MEAS - RVSP: 39.4 MMHG
BH CV ECHO MEAS - SV(LVOT): 120.3 ML
BH CV ECHO MEAS - SV(MOD-SP4): 26.2 ML
BH CV ECHO MEAS - SVI(LVOT): 46.7 ML/M2
BH CV ECHO MEAS - SVI(MOD-SP4): 10.2 ML/M2
BH CV ECHO MEAS - TR MAX PG: 29.4 MMHG
BH CV ECHO MEAS - TR MAX VEL: 271 CM/SEC
BH CV ECHO MEASUREMENTS AVERAGE E/E' RATIO: 23.33
LEFT ATRIUM VOLUME INDEX: 26.9 ML/M2

## 2024-05-09 PROCEDURE — 93306 TTE W/DOPPLER COMPLETE: CPT

## 2024-05-28 ENCOUNTER — TELEPHONE (OUTPATIENT)
Dept: CARDIOLOGY | Facility: CLINIC | Age: 54
End: 2024-05-28

## 2024-05-28 NOTE — TELEPHONE ENCOUNTER
Caller: Luisa Conteh    Relationship: Self    Best call back number: [phone]    What is the best time to reach you: ANYTIME, CAN LEAVE VM    Who are you requesting to speak with (clinical staff, provider,  specific staff member): CLINICAL    What was the call regarding: PT HAS A MISSED CALL FROM OFFICE WITH NO ENCOUNTER IN CHART. ECHO RESULTS RECEIVED RECENTLY. PLEASE CALL PT BACK.

## 2024-05-30 NOTE — TELEPHONE ENCOUNTER
Caller: Luisa Conteh    Relationship: Self    Best call back number: [phone]     What is the best time to reach you: ANYTIME     What was the call regarding: PT REPORTS THAT THEY WERE GIVEN A CALL APPROX 5 MINS AGO  BUT THERE IS NO DOCUMENTATION IN THE CHART ABOUT THIS. IF THIS WAS NOT A MISTAKE, PLEASE CALL PT BACK.     Is it okay if the provider responds through MyChart: CALL

## 2024-06-10 ENCOUNTER — TELEPHONE (OUTPATIENT)
Dept: CARDIOLOGY | Facility: CLINIC | Age: 54
End: 2024-06-10
Payer: MEDICARE

## 2024-06-10 NOTE — TELEPHONE ENCOUNTER
Hub to relay.     I am sending pt a letter to have her contact our office due not being able to get her.     ----- Message from Nelly Longoria sent at 5/24/2024 11:32 AM EDT -----  Valves looked okay but not very well visualized. She may need ROSANNA (a procedure to get a better look at her valves). Please ask her to keep her follow up appointment to discuss further. Thanks.

## 2024-06-18 ENCOUNTER — OFFICE VISIT (OUTPATIENT)
Age: 54
End: 2024-06-18
Payer: MEDICARE

## 2024-06-18 VITALS
HEIGHT: 69 IN | BODY MASS INDEX: 43.4 KG/M2 | DIASTOLIC BLOOD PRESSURE: 79 MMHG | WEIGHT: 293 LBS | OXYGEN SATURATION: 97 % | HEART RATE: 81 BPM | SYSTOLIC BLOOD PRESSURE: 167 MMHG

## 2024-06-18 DIAGNOSIS — R06.09 DYSPNEA ON EXERTION: Primary | ICD-10-CM

## 2024-06-18 DIAGNOSIS — Q23.1 AORTIC STENOSIS DUE TO BICUSPID AORTIC VALVE: ICD-10-CM

## 2024-06-18 DIAGNOSIS — I10 UNCONTROLLED HYPERTENSION: ICD-10-CM

## 2024-06-18 DIAGNOSIS — Q23.0 AORTIC STENOSIS DUE TO BICUSPID AORTIC VALVE: ICD-10-CM

## 2024-06-18 PROCEDURE — 3078F DIAST BP <80 MM HG: CPT | Performed by: INTERNAL MEDICINE

## 2024-06-18 PROCEDURE — 3077F SYST BP >= 140 MM HG: CPT | Performed by: INTERNAL MEDICINE

## 2024-06-18 PROCEDURE — 99214 OFFICE O/P EST MOD 30 MIN: CPT | Performed by: INTERNAL MEDICINE

## 2024-06-18 RX ORDER — LOSARTAN POTASSIUM 50 MG/1
50 TABLET ORAL DAILY
Qty: 30 TABLET | Refills: 3 | Status: SHIPPED | OUTPATIENT
Start: 2024-06-18

## 2024-06-18 NOTE — LETTER
June 22, 2024     Samuel Duane Kreis, MD  [address] [place] [address] [place] [address]    Patient: Luisa Conteh   YOB: 1970   Date of Visit: 6/18/2024     Dear Samuel Duane Kreis, MD:       Thank you for referring Luisa Conteh to me for evaluation. Below are the relevant portions of my assessment and plan of care.    If you have questions, please do not hesitate to call me. I look forward to following Luisa along with you.         Sincerely,        Lewis Khan MD        CC: No Recipients    Lewis Khan MD  06/22/24 1455  Sign when Signing Visit  Kreis, Samuel Duane, MD  Luisa Conteh  1970 06/18/2024    Patient Active Problem List   Diagnosis   • Aortic stenosis with bicuspid valve status post mechanical AVR   • Class 3 severe obesity in adult   • Tobacco abuse   • Type 2 diabetes mellitus without complication, without long-term current use of insulin   • Palpitations   • Bilateral carotid bruits   • Essential hypertension   • Hyperlipidemia   • Sleep apnea with use of continuous positive airway pressure (CPAP)   • Secondary pulmonary arterial hypertension   • Diastolic heart failure   • Impaired functional mobility, balance, gait, and endurance   • Hx of aortic valve replacement, mechanical       Dear :    Subjective     Luisa Conteh is a 53 y.o. female with the problems as listed above, presents    Chief complaint: Follow-up of recent echo Doppler study.    History of Present Illness: Ms. Galindo is a pleasant 53-year-old  female with history of bicuspid aortic valve with severe aortic stenosis, status post aortic valve replacement with a 21 mm [employer] Doug's mechanical valve in 2020.  She was recently seen by us for complaints of shortness of breath for which she underwent evaluation with a transthoracic echo Doppler study.  She is here to review the test results.  Her transthoracic echo Doppler study revealed normal LV wall motion and systolic  function with an LV ejection fraction of 61 to 65%.  The aortic valve prosthesis was not adequately visualized.  There was some increased gradient across the aortic valve prostheses with a peak gradient of 42 and a mean gradient of 25 mmHg.  The mitral valve was not adequately visualized as well.  On today's visit she continues to complain of shortness of breath on walking about 50 yards or so.  She has some intermittent orthopnea and uses CPAP and some intermittent leg edema    Complete Transthoracic Echocardiogram with Complete Doppler and Color Flow    Accession Number: [id number]   Date of Study: 5/9/24   Ordering Provider: Lewis Khan MD   Clinical Indications: Dyspnea, Valvular Function, Prosthetic Valve        [facility]  Performing Staff   Cardiology: Lewis Khan MD    Tech: Alexandria Teixeira          Clinical Indication  Dyspnea; Valvular Function; Prosthetic Valve   Dx: Aortic stenosis due to bicuspid aortic valve, s/p aortic valve replacement with 21 mm Saint Doug's valve in May 2020. [Q23.0, Q23.1 (ICD-10-CM)]; Dyspnea on exertion (NYHA class III) [R06.09 (ICD-10-CM)]     Interpretation Summary   •  Normal left ventricular cavity size and wall thickness noted. All left ventricular wall segments contract normally.  •  Left ventricular ejection fraction appears to be 61 - 65%.  •  The aortic valve is not well visualized. There is a 21 mm, bileaflet, St.Doug mechanical aortic valve prosthesis present.  •  The peak transaortic valvular gradient was 42 mmHg and the mean gradient was 25 mmHg.  •  Estimated right ventricular systolic pressure from tricuspid regurgitation is mildly elevated (35-45 mmHg).  •  There is moderate calcification of the mitral valve posterior leaflet(s). No significant mitral valve regurgitation is present. No significant mitral valve stenosis is present.  •  Mild tricuspid valve regurgitation is present. Estimated right ventricular systolic pressure from tricuspid  regurgitation is mildly elevated (35-45 mmHg).  •  There is no evidence of pericardial effusion. .  •  Comments: May consider a transesophageal echocardiographic study to have a better assessment of the mitral and aortic valves if clinically warranted.    No Known Allergies:    Current Outpatient Medications:   •  atorvastatin (LIPITOR) 10 MG tablet, Take 1 tablet by mouth Daily., Disp: , Rfl:   •  DULoxetine (CYMBALTA) 60 MG capsule, Take 1 capsule by mouth Daily., Disp: , Rfl:   •  furosemide (LASIX) 40 MG tablet, Take 0.5 tablets by mouth Daily., Disp: , Rfl:   •  metoprolol tartrate (LOPRESSOR) 25 MG tablet, Take 1 tablet by mouth Every 12 (Twelve) Hours., Disp: 60 tablet, Rfl: 0  •  potassium chloride (KLOR-CON) 10 MEQ CR tablet, Take 1 tablet by mouth Daily., Disp: , Rfl:   •  warfarin (Coumadin) 5 MG tablet, Take 1 tablet by mouth Every Night., Disp: 30 tablet, Rfl: 0  •  warfarin (Coumadin) 7.5 MG tablet, Take 1/2 tablet by mouth daily as directed, Disp: 15 tablet, Rfl: 0  No current facility-administered medications for this visit.    Facility-Administered Medications Ordered in Other Visits:   •  Chlorhexidine Gluconate Cloth 2 % pads 1 application, 1 application , Topical, Q12H PRN, Xavi Oliveros PA    The following portions of the patient's history were reviewed and updated as appropriate: allergies, current medications, past family history, past medical history, past social history, past surgical history and problem list.    Social History     Tobacco Use   • Smoking status: Every Day     Current packs/day: 0.50     Average packs/day: 0.5 packs/day for 30.0 years (15.0 ttl pk-yrs)     Types: Cigarettes   • Smokeless tobacco: Never   Vaping Use   • Vaping status: Never Used   Substance Use Topics   • Alcohol use: No   • Drug use: No     Review of Systems   Constitutional: Negative for chills and fever.   HENT:  Negative for nosebleeds and sore throat.    Respiratory:  Positive for shortness of breath.  "Negative for cough, hemoptysis and wheezing.    Gastrointestinal:  Negative for abdominal pain, hematemesis, hematochezia, melena, nausea and vomiting.   Genitourinary:  Negative for dysuria and hematuria.   Neurological:  Negative for headaches.     Objective   Vitals:    06/18/24 1538 06/18/24 1541   BP: (!) 186/93 167/79   BP Location: Left arm Right arm   Patient Position: Sitting Sitting   Cuff Size: Adult Adult   Pulse: 83 81   SpO2: 97% 97%   Weight: (!) 157 kg (347 lb) (!) 157 kg (347 lb)   Height: 175.3 cm (69\") 175.3 cm (69\")     Body mass index is 51.24 kg/m².    Vitals reviewed.   Constitutional:       Appearance: Well-developed.   Eyes:      Conjunctiva/sclera: Conjunctivae normal.   HENT:      Head: Normocephalic.   Neck:      Thyroid: No thyromegaly.      Vascular: No JVD.      Trachea: No tracheal deviation.   Pulmonary:      Effort: No respiratory distress.      Breath sounds: Normal breath sounds. No wheezing. No rales.   Cardiovascular:      PMI at left midclavicular line. Normal rate. Regular rhythm. Normal S1. Normal S2. Metallic click of S2 noted       Murmurs: There is no murmur.      No gallop.  No click. No rub.   Pulses:     Intact distal pulses.   Edema:     Pretibial: bilateral 1+ edema of the pretibial area.     Ankle: bilateral 1+ edema of the ankle.     Feet: bilateral 1+ edema of the feet.  Abdominal:      General: Bowel sounds are normal.      Palpations: Abdomen is soft. There is no abdominal mass.      Tenderness: There is no abdominal tenderness.   Musculoskeletal:      Cervical back: Normal range of motion and neck supple. Skin:     General: Skin is warm and dry.   Neurological:      Mental Status: Alert and oriented to person, place, and time.      Cranial Nerves: No cranial nerve deficit.           Assessment & Plan    Diagnosis Plan   1. Dyspnea on exertion (NYHA class III)        2. Aortic stenosis due to bicuspid aortic valve, s/p aortic valve replacement with 21 mm Saint " Doug's valve in May 2020.        3. Uncontrolled hypertension          Recommendations  I have discussed the results of the echo Doppler study with the patient  Since the aortic valve prosthesis was not adequately visualized, I have discussed with her about the option of further evaluation with a transesophageal echocardiographic study to have a better assessment of the aortic valve prosthesis.  I have discussed the procedure, potential risks, benefits and alternatives.  She expressed understanding of same and is wanting to proceed.  I tried to order it on the epic but her insurance company declined.  For her elevated blood pressures, will add losartan 50 mg daily.  I have asked her to keep a check on her blood pressure, write it down and take it to the next visit with .  Check BMP in a week    No follow-ups on file.    As always,   I appreciate very much the opportunity to participate in the cardiovascular care of your patients. Please do not hesitate to call me with any questions with regards to Luisa Conteh's evaluation and management.       With Best Regards,        Lewis Khan MD, FACC    Please note that portions of this note were completed with a voice recognition program.

## 2024-06-18 NOTE — PROGRESS NOTES
Kreis, Samuel Duane, MD  Luisa Conteh  1970 06/18/2024    Patient Active Problem List   Diagnosis    Aortic stenosis with bicuspid valve status post mechanical AVR    Class 3 severe obesity in adult    Tobacco abuse    Type 2 diabetes mellitus without complication, without long-term current use of insulin    Palpitations    Bilateral carotid bruits    Essential hypertension    Hyperlipidemia    Sleep apnea with use of continuous positive airway pressure (CPAP)    Secondary pulmonary arterial hypertension    Diastolic heart failure    Impaired functional mobility, balance, gait, and endurance    Hx of aortic valve replacement, mechanical       Dear Kreis, Samuel Duane, MD:    Subjective     Luisa Conteh is a 53 y.o. female with the problems as listed above, presents    Chief complaint: Follow-up of recent echo Doppler study.    History of Present Illness: Ms. Galindo is a pleasant 53-year-old  female with history of bicuspid aortic valve with severe aortic stenosis, status post aortic valve replacement with a 21 mm [employer] Doug's mechanical valve in 2020.  She was recently seen by us for complaints of shortness of breath for which she underwent evaluation with a transthoracic echo Doppler study.  She is here to review the test results.  Her transthoracic echo Doppler study revealed normal LV wall motion and systolic function with an LV ejection fraction of 61 to 65%.  The aortic valve prosthesis was not adequately visualized.  There was some increased gradient across the aortic valve prostheses with a peak gradient of 42 and a mean gradient of 25 mmHg.  The mitral valve was not adequately visualized as well.  On today's visit she continues to complain of shortness of breath on walking about 50 yards or so.  She has some intermittent orthopnea and uses CPAP and some intermittent leg edema    Complete Transthoracic Echocardiogram with Complete Doppler and Color Flow    Accession Number:  Patient Education        Electrical Cardioversion: What to Expect at Home  Your Recovery     Electrical cardioversion is a treatment for an abnormal heartbeat, such as atrial fibrillation, supraventricular tachycardia, or ventricular tachycardia (VT). Your doctor used a brief electrical shock to reset your heart's rhythm. After the procedure, you may have redness, like a sunburn, where the patches were. The medicines you got to make you sleepy may make you feel drowsy for the rest of the day. Your doctor may have you take medicines to help the heart beat normally and to prevent blood clots. This care sheet gives you a general idea about how long it will take for you to recover. But each person recovers at a different pace. Follow the steps below to feel better as quickly as possible. How can you care for yourself at home? Medicines    · Be safe with medicines. Take your medicines exactly as prescribed. Call your doctor if you think you are having a problem with your medicine. You may take one or more of the following medicines:  ? Rate-control medicines to slow the heart rate. These include beta-blockers, calcium channel blockers, and digoxin. ? Rhythm control medicines that help the heart keep a normal rhythm. ? Blood thinners, also called anticoagulants, which help prevent blood clots. You will get more details on the specific medicines your doctor prescribes. Be sure you know how to take your medicines safely.     · Do not take any vitamins, over-the-counter medicines, or herbal products without talking to your doctor first.   Exercise    · Start light exercise if your doctor says that it's okay. Even a small amount will help you get stronger, have more energy, and manage your stress. Walking is an easy way to get exercise. Start out by walking a little more than you did in the hospital. Bit by bit, increase the amount you walk.     · When you exercise, watch for signs that your heart is working too hard. [id number]   Date of Study: 5/9/24   Ordering Provider: Lewis Khan MD   Clinical Indications: Dyspnea, Valvular Function, Prosthetic Valve        [facility]  Performing Staff   Cardiology: Lewis Khan MD    Tech: Alexandria Teixeira          Clinical Indication  Dyspnea; Valvular Function; Prosthetic Valve   Dx: Aortic stenosis due to bicuspid aortic valve, s/p aortic valve replacement with 21 mm Saint Doug's valve in May 2020. [Q23.0, Q23.1 (ICD-10-CM)]; Dyspnea on exertion (NYHA class III) [R06.09 (ICD-10-CM)]     Interpretation Summary     Normal left ventricular cavity size and wall thickness noted. All left ventricular wall segments contract normally.    Left ventricular ejection fraction appears to be 61 - 65%.    The aortic valve is not well visualized. There is a 21 mm, bileaflet, St.Doug mechanical aortic valve prosthesis present.    The peak transaortic valvular gradient was 42 mmHg and the mean gradient was 25 mmHg.    Estimated right ventricular systolic pressure from tricuspid regurgitation is mildly elevated (35-45 mmHg).    There is moderate calcification of the mitral valve posterior leaflet(s). No significant mitral valve regurgitation is present. No significant mitral valve stenosis is present.    Mild tricuspid valve regurgitation is present. Estimated right ventricular systolic pressure from tricuspid regurgitation is mildly elevated (35-45 mmHg).    There is no evidence of pericardial effusion. .    Comments: May consider a transesophageal echocardiographic study to have a better assessment of the mitral and aortic valves if clinically warranted.    No Known Allergies:    Current Outpatient Medications:     atorvastatin (LIPITOR) 10 MG tablet, Take 1 tablet by mouth Daily., Disp: , Rfl:     DULoxetine (CYMBALTA) 60 MG capsule, Take 1 capsule by mouth Daily., Disp: , Rfl:     furosemide (LASIX) 40 MG tablet, Take 0.5 tablets by mouth Daily., Disp: , Rfl:     metoprolol tartrate  You are pushing too hard if you cannot talk while you are exercising. If you become short of breath or dizzy or have chest pain, sit down and rest right away.     · Check your pulse regularly. Place two fingers on the artery at the palm side of your wrist in line with your thumb. If your heartbeat seems uneven or fast, talk to your doctor. Other instructions    · Ask your doctor when you can drive again.     · Do not smoke. If you need help quitting, talk to your doctor about stop-smoking programs and medicines. These can increase your chances of quitting for good.     · Limit alcohol. Follow-up care is a key part of your treatment and safety. Be sure to make and go to all appointments, and call your doctor if you are having problems. It's also a good idea to know your test results and keep a list of the medicines you take. When should you call for help? Call 911 anytime you think you may need emergency care. For example, call if:    · You passed out (lost consciousness).     · You have chest pain or pressure. This may occur with:  ? Sweating. ? Shortness of breath. ? Nausea or vomiting. ? Pain that spreads from the chest to the neck, jaw, or one or both shoulders or arms. ? A fast or uneven pulse. After calling 911, the  may tell you to chew 1 adult-strength or 2 to 4 low-dose aspirin. Wait for an ambulance. Do not try to drive yourself.     · You have symptoms of a stroke. These may include:  ? Sudden numbness, tingling, weakness, or loss of movement in your face, arm, or leg, especially on only one side of your body. ? Sudden vision changes. ? Sudden trouble speaking. ? Sudden confusion or trouble understanding simple statements. ? Sudden problems with walking or balance. ? A sudden, severe headache that is different from past headaches.    Call your doctor now or seek immediate medical care if:    · You feel dizzy or lightheaded, or you feel like you may faint.     · You have a fast or "(LOPRESSOR) 25 MG tablet, Take 1 tablet by mouth Every 12 (Twelve) Hours., Disp: 60 tablet, Rfl: 0    potassium chloride (KLOR-CON) 10 MEQ CR tablet, Take 1 tablet by mouth Daily., Disp: , Rfl:     warfarin (Coumadin) 5 MG tablet, Take 1 tablet by mouth Every Night., Disp: 30 tablet, Rfl: 0    warfarin (Coumadin) 7.5 MG tablet, Take 1/2 tablet by mouth daily as directed, Disp: 15 tablet, Rfl: 0  No current facility-administered medications for this visit.    Facility-Administered Medications Ordered in Other Visits:     Chlorhexidine Gluconate Cloth 2 % pads 1 application, 1 application , Topical, Q12H PRN, Xavi Oliveros PA    The following portions of the patient's history were reviewed and updated as appropriate: allergies, current medications, past family history, past medical history, past social history, past surgical history and problem list.    Social History     Tobacco Use    Smoking status: Every Day     Current packs/day: 0.50     Average packs/day: 0.5 packs/day for 30.0 years (15.0 ttl pk-yrs)     Types: Cigarettes    Smokeless tobacco: Never   Vaping Use    Vaping status: Never Used   Substance Use Topics    Alcohol use: No    Drug use: No     Review of Systems   Constitutional: Negative for chills and fever.   HENT:  Negative for nosebleeds and sore throat.    Respiratory:  Positive for shortness of breath. Negative for cough, hemoptysis and wheezing.    Gastrointestinal:  Negative for abdominal pain, hematemesis, hematochezia, melena, nausea and vomiting.   Genitourinary:  Negative for dysuria and hematuria.   Neurological:  Negative for headaches.     Objective   Vitals:    06/18/24 1538 06/18/24 1541   BP: (!) 186/93 167/79   BP Location: Left arm Right arm   Patient Position: Sitting Sitting   Cuff Size: Adult Adult   Pulse: 83 81   SpO2: 97% 97%   Weight: (!) 157 kg (347 lb) (!) 157 kg (347 lb)   Height: 175.3 cm (69\") 175.3 cm (69\")     Body mass index is 51.24 kg/m².    Vitals reviewed. " irregular heartbeat. Watch closely for any changes in your health, and be sure to contact your doctor if you have any problems. Where can you learn more? Go to http://www.gray.com/  Enter A617 in the search box to learn more about \"Electrical Cardioversion: What to Expect at Home. \"  Current as of: December 16, 2019               Content Version: 12.6  © 3745-9790 [employer]. Care instructions adapted under license by [employer] (which disclaims liability or warranty for this information). If you have questions about a medical condition or this instruction, always ask your healthcare professional. [address] any warranty or liability for your use of this information.   Constitutional:       Appearance: Well-developed.   Eyes:      Conjunctiva/sclera: Conjunctivae normal.   HENT:      Head: Normocephalic.   Neck:      Thyroid: No thyromegaly.      Vascular: No JVD.      Trachea: No tracheal deviation.   Pulmonary:      Effort: No respiratory distress.      Breath sounds: Normal breath sounds. No wheezing. No rales.   Cardiovascular:      PMI at left midclavicular line. Normal rate. Regular rhythm. Normal S1. Normal S2. Metallic click of S2 noted       Murmurs: There is no murmur.      No gallop.  No click. No rub.   Pulses:     Intact distal pulses.   Edema:     Pretibial: bilateral 1+ edema of the pretibial area.     Ankle: bilateral 1+ edema of the ankle.     Feet: bilateral 1+ edema of the feet.  Abdominal:      General: Bowel sounds are normal.      Palpations: Abdomen is soft. There is no abdominal mass.      Tenderness: There is no abdominal tenderness.   Musculoskeletal:      Cervical back: Normal range of motion and neck supple. Skin:     General: Skin is warm and dry.   Neurological:      Mental Status: Alert and oriented to person, place, and time.      Cranial Nerves: No cranial nerve deficit.           Assessment & Plan    Diagnosis Plan   1. Dyspnea on exertion (NYHA class III)        2. Aortic stenosis due to bicuspid aortic valve, s/p aortic valve replacement with 21 mm Saint Doug's valve in May 2020.        3. Uncontrolled hypertension          Recommendations  I have discussed the results of the echo Doppler study with the patient  Since the aortic valve prosthesis was not adequately visualized, I have discussed with her about the option of further evaluation with a transesophageal echocardiographic study to have a better assessment of the aortic valve prosthesis.  I have discussed the procedure, potential risks, benefits and alternatives.  She expressed understanding of same and is wanting to proceed.  I tried to order it on the epic but her insurance company  declined.  For her elevated blood pressures, will add losartan 50 mg daily.  I have asked her to keep a check on her blood pressure, write it down and take it to the next visit with .  Check BMP in a week    No follow-ups on file.    As always, Kreis, Samuel Duane, MD  I appreciate very much the opportunity to participate in the cardiovascular care of your patients. Please do not hesitate to call me with any questions with regards to Luisa ALENA Conteh's evaluation and management.       With Best Regards,        Lewis Khan MD, [facility]    Please note that portions of this note were completed with a voice recognition program.

## 2024-09-16 ENCOUNTER — TELEPHONE (OUTPATIENT)
Dept: CARDIOLOGY | Facility: CLINIC | Age: 54
End: 2024-09-16

## 2024-09-16 NOTE — TELEPHONE ENCOUNTER
Hub staff attempted to follow warm transfer process and was unsuccessful     Caller: Luisa Conteh    Relationship to patient: Self    Best call back number: [phone]    Patient is needing: PATIENT NEEDS TO RESCHEDULE THE 9.17.2024 APPT. [facility] HAD 0 AVAILABILITY. PLEASE CALL THE PATIENT TO RES.

## 2024-09-26 ENCOUNTER — TELEPHONE (OUTPATIENT)
Dept: CARDIOLOGY | Facility: CLINIC | Age: 54
End: 2024-09-26
Payer: MEDICARE

## 2024-09-26 NOTE — TELEPHONE ENCOUNTER
Hub to relay.     Called pt to remind her to get her labs done prior to her apt on 10/1. No answer LM. She does not need to fast.

## 2024-11-05 RX ORDER — LOSARTAN POTASSIUM 50 MG/1
50 TABLET ORAL DAILY
Qty: 30 TABLET | Refills: 3 | Status: SHIPPED | OUTPATIENT
Start: 2024-11-05

## 2025-03-13 RX ORDER — LOSARTAN POTASSIUM 50 MG/1
50 TABLET ORAL DAILY
Qty: 30 TABLET | Refills: 3 | Status: SHIPPED | OUTPATIENT
Start: 2025-03-13

## 2025-07-28 RX ORDER — LOSARTAN POTASSIUM 50 MG/1
50 TABLET ORAL DAILY
Qty: 30 TABLET | Refills: 3 | OUTPATIENT
Start: 2025-07-28

## 2025-07-28 NOTE — TELEPHONE ENCOUNTER
Hub to relay.     Pt has to been seen in office before we can refill any medications. Pt was LS on 6/18/24. She may also contact her PCP office.

## 2025-07-30 ENCOUNTER — TELEPHONE (OUTPATIENT)
Dept: CARDIOLOGY | Facility: CLINIC | Age: 55
End: 2025-07-30

## 2025-08-07 RX ORDER — LOSARTAN POTASSIUM 50 MG/1
50 TABLET ORAL DAILY
Qty: 30 TABLET | Refills: 0 | Status: SHIPPED | OUTPATIENT
Start: 2025-08-07

## 2025-08-29 ENCOUNTER — TELEPHONE (OUTPATIENT)
Dept: CARDIOLOGY | Facility: CLINIC | Age: 55
End: 2025-08-29
Payer: MEDICARE

## (undated) DEVICE — SUT PROLN 4/0 V7 36IN 8975H

## (undated) DEVICE — ANTIBACTERIAL UNDYED BRAIDED (POLYGLACTIN 910), SYNTHETIC ABSORBABLE SUTURE: Brand: COATED VICRYL

## (undated) DEVICE — SUT PROLN 3/0 V7 D/A 36IN 8976H

## (undated) DEVICE — TRAP,MUCUS SPECIMEN,40CC: Brand: MEDLINE

## (undated) DEVICE — KT INTRO SHEATH PERC W/FULL DRP 9F

## (undated) DEVICE — SAFETY SCALPEL: Brand: DEROYAL

## (undated) DEVICE — SUT SILK 2/0 TIES 18IN A185H

## (undated) DEVICE — PRESSURE MONITORING SET: Brand: TRUWAVE

## (undated) DEVICE — SUT SILK 2/0 CT1 CR8 18IN C022D

## (undated) DEVICE — MEDI-VAC NON-CONDUCTIVE SUCTION TUBING: Brand: CARDINAL HEALTH

## (undated) DEVICE — CATH FOL LUBRISIL IC 2WY 20F 5CC

## (undated) DEVICE — Device

## (undated) DEVICE — SUT SILK 4/0 RB1CR8 18IN C054D

## (undated) DEVICE — SUT PROLN CARDIO V5 3/0 36IN 8936H

## (undated) DEVICE — ORGANIZER SUT GABBAY FRATER GFS10A PK/3

## (undated) DEVICE — SUT STL 2/0 CV SH 24IN TPW32

## (undated) DEVICE — ST INF PRI SMRTSTE 20DRP 2VLV 24ML 117

## (undated) DEVICE — PK CATH CARD 70

## (undated) DEVICE — SUT ETHIB 1 CT1 30IN  X425H

## (undated) DEVICE — SUT SILK 2 SUTUPAK TIE 60IN SA8H 2STRAND

## (undated) DEVICE — CANNULA,OXY,ADULT,SUPER SOFT,W/14'TUB,UC: Brand: MEDLINE INDUSTRIES, INC.

## (undated) DEVICE — DRSNG SURESITE WNDW 4X4.5

## (undated) DEVICE — ADULT DISPOSABLE SINGLE-PATIENT USE PULSE OXIMETER SENSOR: Brand: NONIN

## (undated) DEVICE — ANTIBACTERIAL VIOLET BRAIDED (POLYGLACTIN 910), SYNTHETIC ABSORBABLE SUTURE: Brand: COATED VICRYL

## (undated) DEVICE — TUBING, SUCTION, 1/4" X 10', STRAIGHT: Brand: MEDLINE

## (undated) DEVICE — SKIN AFFIX SURG ADHESIVE 72/CS 0.55ML: Brand: MEDLINE

## (undated) DEVICE — 8 FOOT DISPOSABLE EXTENSION CABLE WITH SAFE CONNECT / ALLIGATOR CLIP

## (undated) DEVICE — OASIS DRAIN, SINGLE, INLINE & ATS COMPATIBLE: Brand: OASIS

## (undated) DEVICE — 2963 MEDIPORE SOFT CLOTH TAPE 3 IN X 10 YD 12 RLS/CS: Brand: 3M™ MEDIPORE™

## (undated) DEVICE — PRESSURE MONITORING SET: Brand: TRUWAVE, VAMP

## (undated) DEVICE — PRESSURE TUBING: Brand: TRUWAVE

## (undated) DEVICE — INTRAOPERATIVE COVER KIT, 10 PACK: Brand: SITE-RITE

## (undated) DEVICE — CATH F5 INF 3DRC 100CM: Brand: INFINITI

## (undated) DEVICE — SUT SILK 0/0 CT2 18IN C027D

## (undated) DEVICE — ADULT, RADIOTRANSPARENT ELEMENT, COMPATIBLE W/ ZOLL: Brand: DEFIBRILLATION ELECTRODES

## (undated) DEVICE — CATH F5 INF PIG145 110CM 6SH: Brand: INFINITI

## (undated) DEVICE — 12 FOOT DISPOSABLE EXTENSION CABLE WITH SAFE CONNECT / SCREW-DOWN

## (undated) DEVICE — PROB TEMP MYOCARDIAL 18MM

## (undated) DEVICE — ASMBL SPK CONTRST CONTRL

## (undated) DEVICE — CATHETER,FOLEY,100%SILICONE,18FR,10ML,LF: Brand: MEDLINE

## (undated) DEVICE — BLD SCLPL BEAVR MINI STR 2BVL 180D LF

## (undated) DEVICE — SHEATH INTRO SUPERSHEATH JWIRE .035 7F 11CM

## (undated) DEVICE — SWAN-GANZ THERMODILUTION CATHETER: Brand: SWAN-GANZ

## (undated) DEVICE — CVR HNDL LIGHT RIGID

## (undated) DEVICE — ST EXT IV SMARTSITE 2VLV SP M LL 5ML IV1

## (undated) DEVICE — SKIN PREP TRAY W/CHG: Brand: MEDLINE INDUSTRIES, INC.

## (undated) DEVICE — PAD ARMBRD SURG CONVOL 7.5X20X2IN

## (undated) DEVICE — CLTH CLENS READYCLEANSE PERI CARE PK/5

## (undated) DEVICE — PK HEART OPN 10

## (undated) DEVICE — CATH F5 INF JL 4 100CM: Brand: INFINITI

## (undated) DEVICE — SHEATH INTRO SUPERSHEATH JWIRE .035 6F 11CM

## (undated) DEVICE — GLV SURG SENSICARE PI MIC PF SZ7 LF STRL

## (undated) DEVICE — SUCTION CANISTER, 2500CC, RIGID: Brand: DEROYAL

## (undated) DEVICE — CONNECTOR PH 6-IN-1 Y ST: Brand: CARDINAL HEALTH

## (undated) DEVICE — PROVIDES A STERILE INTERFACE BETWEEN THE OPERATING ROOM SURGICAL LAMPS (NON-STERILE) AND THE SURGEON OR NURSE (STERILE).: Brand: STERION®CLAMP COVER FABRIC

## (undated) DEVICE — SWAN-GANZ TRUE SIZE THERMODILUTION "S" TIP: Brand: SWAN-GANZ TRUE SIZE

## (undated) DEVICE — SOL NACL 0.9PCT 1000ML

## (undated) DEVICE — GW INQWIRE FC PTFE J/3MM .035 180

## (undated) DEVICE — BNDR ABD PREMIUM/UNIV 10IN 27TO48IN

## (undated) DEVICE — BLAKE CARDIO CONNECTOR 1:1: Brand: J-VAC

## (undated) DEVICE — MYNXGRIP 5F: Brand: MYNXGRIP

## (undated) DEVICE — CATH IV INSYTE AUTOGARD 14G 1 1/2IN ORNG